# Patient Record
Sex: FEMALE | Race: WHITE | NOT HISPANIC OR LATINO | Employment: OTHER | URBAN - METROPOLITAN AREA
[De-identification: names, ages, dates, MRNs, and addresses within clinical notes are randomized per-mention and may not be internally consistent; named-entity substitution may affect disease eponyms.]

---

## 2023-03-07 PROBLEM — M51.26 LUMBAR DISC HERNIATION: Status: ACTIVE | Noted: 2023-03-07

## 2023-03-07 PROBLEM — E03.9 HYPOTHYROID: Status: ACTIVE | Noted: 2023-03-07

## 2023-03-07 PROBLEM — H60.90 OTITIS EXTERNA: Status: ACTIVE | Noted: 2023-03-07

## 2023-03-07 PROBLEM — I25.10 CORONARY ARTERY ARTERIOSCLEROSIS: Status: ACTIVE | Noted: 2023-03-07

## 2023-03-07 PROBLEM — E11.9 TYPE 2 DIABETES MELLITUS (MULTI): Status: ACTIVE | Noted: 2023-03-07

## 2023-03-07 PROBLEM — K21.9 GERD (GASTROESOPHAGEAL REFLUX DISEASE): Status: ACTIVE | Noted: 2023-03-07

## 2023-03-07 PROBLEM — M94.0 COSTOCHONDRITIS, ACUTE: Status: ACTIVE | Noted: 2023-03-07

## 2023-03-07 PROBLEM — E78.5 DYSLIPIDEMIA: Status: ACTIVE | Noted: 2023-03-07

## 2023-03-07 PROBLEM — M54.50 CHRONIC LOW BACK PAIN: Status: ACTIVE | Noted: 2023-03-07

## 2023-03-07 PROBLEM — G89.29 CHRONIC LOW BACK PAIN: Status: ACTIVE | Noted: 2023-03-07

## 2023-03-07 PROBLEM — M70.62 TROCHANTERIC BURSITIS OF LEFT HIP: Status: ACTIVE | Noted: 2023-03-07

## 2023-03-07 PROBLEM — M54.16 LUMBAR NEURITIS: Status: ACTIVE | Noted: 2023-03-07

## 2023-03-07 PROBLEM — I10 HTN (HYPERTENSION): Status: ACTIVE | Noted: 2023-03-07

## 2023-03-07 PROBLEM — F41.9 ANXIETY: Status: ACTIVE | Noted: 2023-03-07

## 2023-03-07 PROBLEM — E55.9 HYPOVITAMINOSIS D: Status: ACTIVE | Noted: 2023-03-07

## 2023-03-07 RX ORDER — METFORMIN HYDROCHLORIDE 500 MG/1
1 TABLET ORAL EVERY 12 HOURS
COMMUNITY
Start: 2019-02-19 | End: 2023-03-30 | Stop reason: SDUPTHER

## 2023-03-07 RX ORDER — DULAGLUTIDE 0.75 MG/.5ML
0.75 INJECTION, SOLUTION SUBCUTANEOUS
COMMUNITY
Start: 2022-04-06 | End: 2023-09-16 | Stop reason: ALTCHOICE

## 2023-03-07 RX ORDER — ASPIRIN 81 MG/1
1 TABLET ORAL DAILY
COMMUNITY
Start: 2016-06-15

## 2023-03-07 RX ORDER — LEVOTHYROXINE SODIUM 100 UG/1
1 TABLET ORAL DAILY
COMMUNITY
Start: 2014-01-22 | End: 2024-01-10

## 2023-03-07 RX ORDER — DULAGLUTIDE 1.5 MG/.5ML
1.5 INJECTION, SOLUTION SUBCUTANEOUS
COMMUNITY
Start: 2021-07-16 | End: 2023-07-21

## 2023-03-07 RX ORDER — BLOOD-GLUCOSE CONTROL, NORMAL
EACH MISCELLANEOUS
COMMUNITY
End: 2024-01-03 | Stop reason: ALTCHOICE

## 2023-03-07 RX ORDER — NAPROXEN SODIUM 220 MG/1
1 TABLET ORAL DAILY
COMMUNITY
Start: 2019-10-30 | End: 2024-04-29 | Stop reason: ALTCHOICE

## 2023-03-07 RX ORDER — BLOOD-GLUCOSE METER
EACH MISCELLANEOUS 3 TIMES DAILY
COMMUNITY
Start: 2020-05-14 | End: 2024-04-22 | Stop reason: SDUPTHER

## 2023-03-07 RX ORDER — SIMVASTATIN 20 MG/1
1 TABLET, FILM COATED ORAL DAILY
COMMUNITY
Start: 2015-11-03

## 2023-03-07 RX ORDER — VALSARTAN 80 MG/1
1 TABLET ORAL DAILY
COMMUNITY
Start: 2014-01-22 | End: 2023-05-10 | Stop reason: SDUPTHER

## 2023-03-07 RX ORDER — CHOLECALCIFEROL (VITAMIN D3) 50 MCG
2000 TABLET ORAL DAILY
COMMUNITY

## 2023-03-07 RX ORDER — ORPHENADRINE CITRATE 100 MG/1
100 TABLET, EXTENDED RELEASE ORAL EVERY 12 HOURS
COMMUNITY
End: 2023-09-16 | Stop reason: ALTCHOICE

## 2023-03-07 RX ORDER — CIPROFLOXACIN AND DEXAMETHASONE 3; 1 MG/ML; MG/ML
4 SUSPENSION/ DROPS AURICULAR (OTIC) 2 TIMES DAILY
COMMUNITY
Start: 2022-08-22 | End: 2023-09-16 | Stop reason: ALTCHOICE

## 2023-03-07 RX ORDER — GLIPIZIDE 10 MG/1
1 TABLET, FILM COATED, EXTENDED RELEASE ORAL DAILY
COMMUNITY
Start: 2018-09-25 | End: 2023-03-09 | Stop reason: SDUPTHER

## 2023-03-09 ENCOUNTER — OFFICE VISIT (OUTPATIENT)
Dept: PRIMARY CARE | Facility: CLINIC | Age: 78
End: 2023-03-09
Payer: MEDICARE

## 2023-03-09 VITALS
OXYGEN SATURATION: 100 % | WEIGHT: 186.2 LBS | DIASTOLIC BLOOD PRESSURE: 62 MMHG | HEART RATE: 80 BPM | SYSTOLIC BLOOD PRESSURE: 104 MMHG | BODY MASS INDEX: 34.06 KG/M2

## 2023-03-09 DIAGNOSIS — E11.69 TYPE 2 DIABETES MELLITUS WITH OTHER SPECIFIED COMPLICATION, UNSPECIFIED WHETHER LONG TERM INSULIN USE (MULTI): Primary | ICD-10-CM

## 2023-03-09 PROCEDURE — 3074F SYST BP LT 130 MM HG: CPT | Performed by: STUDENT IN AN ORGANIZED HEALTH CARE EDUCATION/TRAINING PROGRAM

## 2023-03-09 PROCEDURE — 99213 OFFICE O/P EST LOW 20 MIN: CPT | Performed by: STUDENT IN AN ORGANIZED HEALTH CARE EDUCATION/TRAINING PROGRAM

## 2023-03-09 PROCEDURE — 1159F MED LIST DOCD IN RCRD: CPT | Performed by: STUDENT IN AN ORGANIZED HEALTH CARE EDUCATION/TRAINING PROGRAM

## 2023-03-09 PROCEDURE — 3078F DIAST BP <80 MM HG: CPT | Performed by: STUDENT IN AN ORGANIZED HEALTH CARE EDUCATION/TRAINING PROGRAM

## 2023-03-09 RX ORDER — NAPROXEN 500 MG/1
1 TABLET ORAL 2 TIMES DAILY
COMMUNITY
Start: 2022-08-31 | End: 2023-09-16 | Stop reason: ALTCHOICE

## 2023-03-09 RX ORDER — AMOXICILLIN 500 MG/1
CAPSULE ORAL
COMMUNITY
Start: 2022-04-12 | End: 2023-09-16 | Stop reason: ALTCHOICE

## 2023-03-09 RX ORDER — AMOXICILLIN AND CLAVULANATE POTASSIUM 875; 125 MG/1; MG/1
1 TABLET, FILM COATED ORAL 2 TIMES DAILY
COMMUNITY
Start: 2022-07-25 | End: 2023-09-16 | Stop reason: ALTCHOICE

## 2023-03-09 RX ORDER — GLIPIZIDE 10 MG/1
10 TABLET, FILM COATED, EXTENDED RELEASE ORAL DAILY
Qty: 90 TABLET | Refills: 3 | Status: SHIPPED | OUTPATIENT
Start: 2023-03-09 | End: 2024-01-24

## 2023-03-09 RX ORDER — MELOXICAM 15 MG/1
TABLET ORAL
COMMUNITY
Start: 2022-05-23 | End: 2023-09-16 | Stop reason: ALTCHOICE

## 2023-03-09 RX ORDER — OMEPRAZOLE 20 MG/1
CAPSULE, DELAYED RELEASE ORAL
COMMUNITY
Start: 2022-09-11 | End: 2023-09-16 | Stop reason: ALTCHOICE

## 2023-03-09 RX ORDER — MOMETASONE FUROATE 1 MG/G
OINTMENT TOPICAL
COMMUNITY
Start: 2023-03-01

## 2023-03-09 NOTE — PATIENT INSTRUCTIONS
1.  Diabetes follow-up.  She had elevated recent fasting glucose readings.  However she had a recent cortisone shot due to back pain.  She had an excellent hemoglobin A1c of 7.0, 3 months ago.  Okay to wait 3 more months before we repeat  Continue current meds healthy diet and fitness.

## 2023-03-09 NOTE — PROGRESS NOTES
Subjective   Patient ID: Cherrie Garcia is a 77 y.o. female who presents for Diabetes.    HPI comes in to discuss some elevated blood pressure readings the past several weeks.  However she did have a steroid shot with a specialist provider for low back pain.    Review of Systems  Constitutional: NO F, chills, or sweats  Eyes: no blurred vision or visual disturbance  ENT: no hearing loss, no congestion, no nasal discharge, no hoarseness and no sore throat.   Cardiovascular: no chest pain, no edema, no palps and no syncope.   Respiratory: no cough,no s.o.b. and no wheezing  Gastrointestinal: no abdominal pain, No C/D no N/V, no blood in stools  Genitourinary: no dysuria, no change in urinary frequency, no urinary hesitancy and no feelings of urinary urgency.   Musculoskeletal: no arthralgias,  no back pain and no myalgias.   Integumentary: no new skin lesions and no rashes.   Neurological: no difficulty walking, no headache, no limb weakness, no numbness and no tingling.   Psychiatric: no anxiety, no depression, no anhedonia and no substance use disorders.   Endocrine: Some elevated blood glucose readings at home  Objective   /62 (BP Location: Left arm, Patient Position: Sitting, BP Cuff Size: Large adult)   Pulse 80   Wt 84.5 kg (186 lb 3.2 oz)   SpO2 100%   BMI 34.06 kg/m²     Physical Exam  gen- a & o x 3, nad, pleasant  heent- eomi, perrla, ear canals patent, TM's non-erythematous, no fluid, frontal and maxillary sinus's nontender  neck- supple, nontender, no palpable or enlarged nodes, no thyromegaly  heart- rrr, no murmurs  lungs- cta b/l , no w/r/r      Assessment/Plan     1.  Diabetes follow-up.  She had elevated recent fasting glucose readings.  However she had a recent cortisone shot due to back pain.  She had an excellent hemoglobin A1c of 7.0, 3 months ago.  Okay to wait 3 more months before we repeat  Continue current meds healthy diet and fitness.

## 2023-03-30 DIAGNOSIS — E11.69 TYPE 2 DIABETES MELLITUS WITH OTHER SPECIFIED COMPLICATION, UNSPECIFIED WHETHER LONG TERM INSULIN USE (MULTI): Primary | ICD-10-CM

## 2023-03-30 RX ORDER — METFORMIN HYDROCHLORIDE 500 MG/1
500 TABLET ORAL EVERY 12 HOURS
Qty: 90 TABLET | Refills: 0 | Status: SHIPPED | OUTPATIENT
Start: 2023-03-30 | End: 2023-05-05 | Stop reason: SDUPTHER

## 2023-05-05 DIAGNOSIS — E11.69 TYPE 2 DIABETES MELLITUS WITH OTHER SPECIFIED COMPLICATION, UNSPECIFIED WHETHER LONG TERM INSULIN USE (MULTI): ICD-10-CM

## 2023-05-05 RX ORDER — METFORMIN HYDROCHLORIDE 500 MG/1
500 TABLET ORAL EVERY 12 HOURS
Qty: 180 TABLET | Refills: 3 | Status: SHIPPED | OUTPATIENT
Start: 2023-05-05 | End: 2024-04-19

## 2023-05-05 NOTE — TELEPHONE ENCOUNTER
Patient left a VM requesting a refill for Metformin 500mg, 90 day supply to be sent to [facility] pharmacy on [address].

## 2023-05-10 DIAGNOSIS — I10 PRIMARY HYPERTENSION: Primary | ICD-10-CM

## 2023-05-10 RX ORDER — VALSARTAN 80 MG/1
80 TABLET ORAL DAILY
Qty: 90 TABLET | Refills: 3 | Status: SHIPPED | OUTPATIENT
Start: 2023-05-10 | End: 2024-04-19

## 2023-05-10 NOTE — TELEPHONE ENCOUNTER
Patient came into the office for a medication refill for Valsartan 80mg to be sent to [facility] on [address] in [place].

## 2023-06-12 ENCOUNTER — TELEPHONE (OUTPATIENT)
Dept: PRIMARY CARE | Facility: CLINIC | Age: 78
End: 2023-06-12
Payer: MEDICARE

## 2023-06-12 DIAGNOSIS — E78.5 DYSLIPIDEMIA: ICD-10-CM

## 2023-06-12 DIAGNOSIS — Z13.0 SCREENING FOR DEFICIENCY ANEMIA: Primary | ICD-10-CM

## 2023-06-12 DIAGNOSIS — Z12.5 PROSTATE CANCER SCREENING: ICD-10-CM

## 2023-06-12 DIAGNOSIS — E55.9 HYPOVITAMINOSIS D: ICD-10-CM

## 2023-06-12 DIAGNOSIS — E03.8 HYPOTHYROIDISM DUE TO HASHIMOTO'S THYROIDITIS: ICD-10-CM

## 2023-06-12 DIAGNOSIS — Z13.29 SCREENING FOR THYROID DISORDER: ICD-10-CM

## 2023-06-12 DIAGNOSIS — E06.3 HYPOTHYROIDISM DUE TO HASHIMOTO'S THYROIDITIS: ICD-10-CM

## 2023-06-12 DIAGNOSIS — E11.69 TYPE 2 DIABETES MELLITUS WITH OTHER SPECIFIED COMPLICATION, UNSPECIFIED WHETHER LONG TERM INSULIN USE (MULTI): ICD-10-CM

## 2023-06-12 DIAGNOSIS — I10 PRIMARY HYPERTENSION: ICD-10-CM

## 2023-06-12 NOTE — TELEPHONE ENCOUNTER
Patient called stating you wanted her to call the office to have you order her blood work prior to her appt  *please advise

## 2023-06-16 ENCOUNTER — LAB (OUTPATIENT)
Dept: LAB | Facility: LAB | Age: 78
End: 2023-06-16
Payer: MEDICARE

## 2023-06-16 DIAGNOSIS — E55.9 HYPOVITAMINOSIS D: ICD-10-CM

## 2023-06-16 DIAGNOSIS — Z12.5 PROSTATE CANCER SCREENING: ICD-10-CM

## 2023-06-16 DIAGNOSIS — I10 PRIMARY HYPERTENSION: ICD-10-CM

## 2023-06-16 DIAGNOSIS — Z13.29 SCREENING FOR THYROID DISORDER: ICD-10-CM

## 2023-06-16 DIAGNOSIS — E03.8 HYPOTHYROIDISM DUE TO HASHIMOTO'S THYROIDITIS: ICD-10-CM

## 2023-06-16 DIAGNOSIS — E11.69 TYPE 2 DIABETES MELLITUS WITH OTHER SPECIFIED COMPLICATION, UNSPECIFIED WHETHER LONG TERM INSULIN USE (MULTI): ICD-10-CM

## 2023-06-16 DIAGNOSIS — E78.5 DYSLIPIDEMIA: ICD-10-CM

## 2023-06-16 DIAGNOSIS — E06.3 HYPOTHYROIDISM DUE TO HASHIMOTO'S THYROIDITIS: ICD-10-CM

## 2023-06-16 DIAGNOSIS — Z13.0 SCREENING FOR DEFICIENCY ANEMIA: ICD-10-CM

## 2023-06-16 LAB
ALANINE AMINOTRANSFERASE (SGPT) (U/L) IN SER/PLAS: 20 U/L (ref 7–45)
ALBUMIN (G/DL) IN SER/PLAS: 4 G/DL (ref 3.4–5)
ALBUMIN (MG/L) IN URINE: 7.4 MG/L
ALBUMIN/CREATININE (UG/MG) IN URINE: 4.6 UG/MG CRT (ref 0–30)
ALKALINE PHOSPHATASE (U/L) IN SER/PLAS: 32 U/L (ref 33–136)
ANION GAP IN SER/PLAS: 11 MMOL/L (ref 10–20)
APPEARANCE, URINE: NORMAL
ASPARTATE AMINOTRANSFERASE (SGOT) (U/L) IN SER/PLAS: 22 U/L (ref 9–39)
BASOPHILS (10*3/UL) IN BLOOD BY AUTOMATED COUNT: 0.05 X10E9/L (ref 0–0.1)
BASOPHILS/100 LEUKOCYTES IN BLOOD BY AUTOMATED COUNT: 1.1 % (ref 0–2)
BILIRUBIN TOTAL (MG/DL) IN SER/PLAS: 1.5 MG/DL (ref 0–1.2)
BILIRUBIN, URINE: NEGATIVE
BLOOD, URINE: NEGATIVE
CALCIDIOL (25 OH VITAMIN D3) (NG/ML) IN SER/PLAS: 36 NG/ML
CALCIUM (MG/DL) IN SER/PLAS: 8.9 MG/DL (ref 8.6–10.3)
CARBON DIOXIDE, TOTAL (MMOL/L) IN SER/PLAS: 28 MMOL/L (ref 21–32)
CHLORIDE (MMOL/L) IN SER/PLAS: 106 MMOL/L (ref 98–107)
CHOLESTEROL (MG/DL) IN SER/PLAS: 112 MG/DL (ref 0–199)
CHOLESTEROL IN HDL (MG/DL) IN SER/PLAS: 52.2 MG/DL
CHOLESTEROL/HDL RATIO: 2.1
COBALAMIN (VITAMIN B12) (PG/ML) IN SER/PLAS: 271 PG/ML (ref 211–911)
COLOR, URINE: YELLOW
CREATININE (MG/DL) IN SER/PLAS: 0.61 MG/DL (ref 0.5–1.05)
CREATININE (MG/DL) IN URINE: 161 MG/DL (ref 20–320)
EOSINOPHILS (10*3/UL) IN BLOOD BY AUTOMATED COUNT: 0.23 X10E9/L (ref 0–0.4)
EOSINOPHILS/100 LEUKOCYTES IN BLOOD BY AUTOMATED COUNT: 4.9 % (ref 0–6)
ERYTHROCYTE DISTRIBUTION WIDTH (RATIO) BY AUTOMATED COUNT: 11.9 % (ref 11.5–14.5)
ERYTHROCYTE MEAN CORPUSCULAR HEMOGLOBIN CONCENTRATION (G/DL) BY AUTOMATED: 33.3 G/DL (ref 32–36)
ERYTHROCYTE MEAN CORPUSCULAR VOLUME (FL) BY AUTOMATED COUNT: 94 FL (ref 80–100)
ERYTHROCYTES (10*6/UL) IN BLOOD BY AUTOMATED COUNT: 4.44 X10E12/L (ref 4–5.2)
ESTIMATED AVERAGE GLUCOSE FOR HBA1C: 186 MG/DL
GFR FEMALE: >90 ML/MIN/1.73M2
GLUCOSE (MG/DL) IN SER/PLAS: 132 MG/DL (ref 74–99)
GLUCOSE, URINE: NEGATIVE MG/DL
HEMATOCRIT (%) IN BLOOD BY AUTOMATED COUNT: 41.8 % (ref 36–46)
HEMOGLOBIN (G/DL) IN BLOOD: 13.9 G/DL (ref 12–16)
HEMOGLOBIN A1C/HEMOGLOBIN TOTAL IN BLOOD: 8.1 %
IMMATURE GRANULOCYTES/100 LEUKOCYTES IN BLOOD BY AUTOMATED COUNT: 0.2 % (ref 0–0.9)
KETONES, URINE: NEGATIVE MG/DL
LDL: 35 MG/DL (ref 0–99)
LEUKOCYTE ESTERASE, URINE: NEGATIVE
LEUKOCYTES (10*3/UL) IN BLOOD BY AUTOMATED COUNT: 4.7 X10E9/L (ref 4.4–11.3)
LYMPHOCYTES (10*3/UL) IN BLOOD BY AUTOMATED COUNT: 1.61 X10E9/L (ref 0.8–3)
LYMPHOCYTES/100 LEUKOCYTES IN BLOOD BY AUTOMATED COUNT: 34 % (ref 13–44)
MONOCYTES (10*3/UL) IN BLOOD BY AUTOMATED COUNT: 0.36 X10E9/L (ref 0.05–0.8)
MONOCYTES/100 LEUKOCYTES IN BLOOD BY AUTOMATED COUNT: 7.6 % (ref 2–10)
NEUTROPHILS (10*3/UL) IN BLOOD BY AUTOMATED COUNT: 2.47 X10E9/L (ref 1.6–5.5)
NEUTROPHILS/100 LEUKOCYTES IN BLOOD BY AUTOMATED COUNT: 52.2 % (ref 40–80)
NITRITE, URINE: NEGATIVE
NRBC (PER 100 WBCS) BY AUTOMATED COUNT: 0 /100 WBC (ref 0–0)
PH, URINE: 5 (ref 5–8)
PLATELETS (10*3/UL) IN BLOOD AUTOMATED COUNT: 243 X10E9/L (ref 150–450)
POTASSIUM (MMOL/L) IN SER/PLAS: 4.8 MMOL/L (ref 3.5–5.3)
PROTEIN TOTAL: 5.8 G/DL (ref 6.4–8.2)
PROTEIN, URINE: NEGATIVE MG/DL
SODIUM (MMOL/L) IN SER/PLAS: 140 MMOL/L (ref 136–145)
SPECIFIC GRAVITY, URINE: 1.02 (ref 1–1.03)
THYROTROPIN (MIU/L) IN SER/PLAS BY DETECTION LIMIT <= 0.05 MIU/L: 4.31 MIU/L (ref 0.44–3.98)
THYROXINE (T4) FREE (NG/DL) IN SER/PLAS: 1.12 NG/DL (ref 0.61–1.12)
TRIGLYCERIDE (MG/DL) IN SER/PLAS: 126 MG/DL (ref 0–149)
UREA NITROGEN (MG/DL) IN SER/PLAS: 12 MG/DL (ref 6–23)
UROBILINOGEN, URINE: <2 MG/DL (ref 0–1.9)
VLDL: 25 MG/DL (ref 0–40)

## 2023-06-16 PROCEDURE — 82043 UR ALBUMIN QUANTITATIVE: CPT

## 2023-06-16 PROCEDURE — 82306 VITAMIN D 25 HYDROXY: CPT

## 2023-06-16 PROCEDURE — 81003 URINALYSIS AUTO W/O SCOPE: CPT

## 2023-06-16 PROCEDURE — 83036 HEMOGLOBIN GLYCOSYLATED A1C: CPT

## 2023-06-16 PROCEDURE — 85025 COMPLETE CBC W/AUTO DIFF WBC: CPT

## 2023-06-16 PROCEDURE — 84439 ASSAY OF FREE THYROXINE: CPT

## 2023-06-16 PROCEDURE — 82607 VITAMIN B-12: CPT

## 2023-06-16 PROCEDURE — 80053 COMPREHEN METABOLIC PANEL: CPT

## 2023-06-16 PROCEDURE — 36415 COLL VENOUS BLD VENIPUNCTURE: CPT

## 2023-06-16 PROCEDURE — 82570 ASSAY OF URINE CREATININE: CPT

## 2023-06-16 PROCEDURE — 84443 ASSAY THYROID STIM HORMONE: CPT

## 2023-06-16 PROCEDURE — 80061 LIPID PANEL: CPT

## 2023-06-21 ENCOUNTER — APPOINTMENT (OUTPATIENT)
Dept: PRIMARY CARE | Facility: CLINIC | Age: 78
End: 2023-06-21
Payer: MEDICARE

## 2023-07-20 DIAGNOSIS — E11.9 TYPE 2 DIABETES MELLITUS WITHOUT COMPLICATIONS (MULTI): ICD-10-CM

## 2023-07-20 PROBLEM — M19.90 ARTHRITIS: Status: ACTIVE | Noted: 2023-07-20

## 2023-07-20 PROBLEM — I10 HYPERTENSION: Status: ACTIVE | Noted: 2023-07-20

## 2023-07-20 PROBLEM — M25.562 CHRONIC PAIN OF LEFT KNEE: Status: ACTIVE | Noted: 2017-01-18

## 2023-07-20 PROBLEM — G89.29 CHRONIC PAIN OF LEFT KNEE: Status: ACTIVE | Noted: 2017-01-18

## 2023-07-20 PROBLEM — G89.29 HEEL PAIN, CHRONIC: Status: ACTIVE | Noted: 2017-01-18

## 2023-07-20 PROBLEM — M79.673 HEEL PAIN, CHRONIC: Status: ACTIVE | Noted: 2017-01-18

## 2023-07-20 PROBLEM — K21.9 GASTROESOPHAGEAL REFLUX DISEASE: Status: ACTIVE | Noted: 2023-07-20

## 2023-07-20 PROBLEM — M54.50 LOW BACK PAIN: Status: ACTIVE | Noted: 2023-07-20

## 2023-07-20 RX ORDER — ERYTHROMYCIN 5 MG/G
OINTMENT OPHTHALMIC
COMMUNITY
Start: 2023-07-05 | End: 2023-09-16 | Stop reason: ALTCHOICE

## 2023-07-20 RX ORDER — GABAPENTIN 300 MG/1
CAPSULE ORAL
COMMUNITY
End: 2023-09-16 | Stop reason: ALTCHOICE

## 2023-07-21 RX ORDER — DULAGLUTIDE 1.5 MG/.5ML
1.5 INJECTION, SOLUTION SUBCUTANEOUS
Qty: 6 ML | Refills: 3 | Status: SHIPPED | OUTPATIENT
Start: 2023-07-21 | End: 2024-02-26 | Stop reason: ALTCHOICE

## 2023-09-16 ENCOUNTER — OFFICE VISIT (OUTPATIENT)
Dept: PRIMARY CARE | Facility: CLINIC | Age: 78
End: 2023-09-16
Payer: MEDICARE

## 2023-09-16 VITALS
DIASTOLIC BLOOD PRESSURE: 60 MMHG | WEIGHT: 186.4 LBS | OXYGEN SATURATION: 98 % | SYSTOLIC BLOOD PRESSURE: 104 MMHG | HEART RATE: 85 BPM | BODY MASS INDEX: 34.09 KG/M2

## 2023-09-16 DIAGNOSIS — J06.9 URI, ACUTE: Primary | ICD-10-CM

## 2023-09-16 PROCEDURE — 3074F SYST BP LT 130 MM HG: CPT | Performed by: STUDENT IN AN ORGANIZED HEALTH CARE EDUCATION/TRAINING PROGRAM

## 2023-09-16 PROCEDURE — 99213 OFFICE O/P EST LOW 20 MIN: CPT | Performed by: STUDENT IN AN ORGANIZED HEALTH CARE EDUCATION/TRAINING PROGRAM

## 2023-09-16 PROCEDURE — 3078F DIAST BP <80 MM HG: CPT | Performed by: STUDENT IN AN ORGANIZED HEALTH CARE EDUCATION/TRAINING PROGRAM

## 2023-09-16 PROCEDURE — 1036F TOBACCO NON-USER: CPT | Performed by: STUDENT IN AN ORGANIZED HEALTH CARE EDUCATION/TRAINING PROGRAM

## 2023-09-16 PROCEDURE — 1126F AMNT PAIN NOTED NONE PRSNT: CPT | Performed by: STUDENT IN AN ORGANIZED HEALTH CARE EDUCATION/TRAINING PROGRAM

## 2023-09-16 PROCEDURE — 1159F MED LIST DOCD IN RCRD: CPT | Performed by: STUDENT IN AN ORGANIZED HEALTH CARE EDUCATION/TRAINING PROGRAM

## 2023-09-16 RX ORDER — SULFAMETHOXAZOLE AND TRIMETHOPRIM 800; 160 MG/1; MG/1
1 TABLET ORAL 2 TIMES DAILY
Qty: 20 TABLET | Refills: 0 | Status: SHIPPED | OUTPATIENT
Start: 2023-09-16 | End: 2023-09-26

## 2023-09-16 ASSESSMENT — ENCOUNTER SYMPTOMS: DEPRESSION: 0

## 2023-09-16 ASSESSMENT — PAIN SCALES - GENERAL: PAINLEVEL: 0-NO PAIN

## 2023-09-16 NOTE — PATIENT INSTRUCTIONS
1.  Several days of upper respiratory sinus symptoms concern for early sinusitis.  Bactrim p.o. twice daily x10 days.  Drinking plenty of fluids and getting lots of rest. Chicken soup and hot beverages may help.  Trial of nasal irrigation with a Nettipot or squeeze bottle with sterile salt water.  Nasal spray corticosteroids (Flonase) may help in reducing the inflammatory response in the nasal passages and airways. Please try 2 sprays each nostril daily for 2 weeks.  If you have season allergies, please take a daily antihistamine of your choice, such as Zyrtec/Claritin/Allegra at bedtime.  Take Tylenol or Motrin/Aleve for sinus or ear pain.  If you have good blood pressure you can try pseudofed (you must ask the pharmacist for it and show ID).  Please take your antibiotic until all gone. Also take a probiotic or daily yogurt to help reduce common side effects of upset stomach and diarrhea.  Please call or return to the office if you are not feeling better in the next 3-4 days after starting treatment.    #2.  She also has a small infection, cellulitis, of her foot.  The Bactrim should help with this as well.

## 2023-09-16 NOTE — PROGRESS NOTES
Subjective   Patient ID: Cherrie Garcia is a 78 y.o. female who presents for Sinusitis and Rash (Rash on toe of right foot.).    HPI comes in with several days of upper respiratory sinus pressure congestion no fever no chills no sweats mild cough no wheezing.    Review of Systems  Constitutional: Symptoms as per history of present   Eyes: no blurred vision or visual disturbance  ENT: no hearing loss, positive symptoms as per history of present illness  Cardiovascular: no chest pain, no edema, no palps and no syncope.   Respiratory: symptoms as per history of present illness  Gastrointestinal: no abdominal pain, No C/D no N/V, no blood in stools  Genitourinary: no dysuria, no change in urinary frequency, no urinary hesitancy and no feelings of urinary urgency.   Musculoskeletal: no arthralgias,  no back pain and no myalgias.   Neurological: no difficulty walking, no headache, no limb weakness, no numbness and no tingling.    Objective   /60 (BP Location: Left arm, Patient Position: Sitting, BP Cuff Size: Large adult)   Pulse 85   Wt 84.6 kg (186 lb 6.4 oz)   SpO2 98%   BMI 34.09 kg/m²     Physical Exam  gen- a & o x 3, positive coughing  heent- eomi, perrla, positive fluid behind TM's however non-erythematous, positive postnasal drip, positive rhinorrhea inflamed mucous membranes in the nasopharynx, positive sinus TTP  neck- positive cervical lymphadenopathy  heart- rrr, no murmurs  lungs- cta b/l , no w/r/r  chest- symmetric, nontender  ab- soft, nontender, no organomegaly, +bowel sounds  ex's- no c/c/e  neuro- CNs 2-12 grossly intact, full sensation and strength in all ex's    Assessment/Plan     1.  Several days of upper respiratory sinus symptoms concern for early sinusitis.  Bactrim p.o. twice daily x10 days.  Drinking plenty of fluids and getting lots of rest. Chicken soup and hot beverages may help.  Trial of nasal irrigation with a Nettipot or squeeze bottle with sterile salt water.  Nasal spray  corticosteroids (Flonase) may help in reducing the inflammatory response in the nasal passages and airways. Please try 2 sprays each nostril daily for 2 weeks.  If you have season allergies, please take a daily antihistamine of your choice, such as Zyrtec/Claritin/Allegra at bedtime.  Take Tylenol or Motrin/Aleve for sinus or ear pain.  If you have good blood pressure you can try pseudofed (you must ask the pharmacist for it and show ID).  Please take your antibiotic until all gone. Also take a probiotic or daily yogurt to help reduce common side effects of upset stomach and diarrhea.  Please call or return to the office if you are not feeling better in the next 3-4 days after starting treatment.    #2.  She also has a small infection, cellulitis, of her foot.  The Bactrim should help with this as well.

## 2023-11-28 ENCOUNTER — TELEPHONE (OUTPATIENT)
Dept: PRIMARY CARE | Facility: CLINIC | Age: 78
End: 2023-11-28
Payer: MEDICARE

## 2023-11-28 DIAGNOSIS — E55.9 HYPOVITAMINOSIS D: ICD-10-CM

## 2023-11-28 DIAGNOSIS — K21.9 GASTROESOPHAGEAL REFLUX DISEASE, UNSPECIFIED WHETHER ESOPHAGITIS PRESENT: ICD-10-CM

## 2023-11-28 DIAGNOSIS — E11.10: ICD-10-CM

## 2023-11-28 DIAGNOSIS — E03.8 HYPOTHYROIDISM DUE TO HASHIMOTO'S THYROIDITIS: ICD-10-CM

## 2023-11-28 DIAGNOSIS — Z00.00 WELLNESS EXAMINATION: Primary | ICD-10-CM

## 2023-11-28 DIAGNOSIS — Z13.29 SCREENING FOR THYROID DISORDER: ICD-10-CM

## 2023-11-28 DIAGNOSIS — I10 PRIMARY HYPERTENSION: ICD-10-CM

## 2023-11-28 DIAGNOSIS — E06.3 HYPOTHYROIDISM DUE TO HASHIMOTO'S THYROIDITIS: ICD-10-CM

## 2023-11-28 DIAGNOSIS — Z13.6 ENCOUNTER FOR SCREENING FOR CARDIOVASCULAR DISORDERS: ICD-10-CM

## 2023-11-28 DIAGNOSIS — Z13.0 SCREENING FOR DEFICIENCY ANEMIA: ICD-10-CM

## 2023-11-28 NOTE — TELEPHONE ENCOUNTER
Pt called and is coming in on 12/11 for her medicare wellness and would like for you to place the orders for her blood work so that she can get them done prior to appt.

## 2023-12-05 ENCOUNTER — LAB (OUTPATIENT)
Dept: LAB | Facility: LAB | Age: 78
End: 2023-12-05
Payer: MEDICARE

## 2023-12-05 DIAGNOSIS — Z00.00 WELLNESS EXAMINATION: ICD-10-CM

## 2023-12-05 DIAGNOSIS — E06.3 HYPOTHYROIDISM DUE TO HASHIMOTO'S THYROIDITIS: ICD-10-CM

## 2023-12-05 DIAGNOSIS — E03.8 HYPOTHYROIDISM DUE TO HASHIMOTO'S THYROIDITIS: ICD-10-CM

## 2023-12-05 DIAGNOSIS — K21.9 GASTROESOPHAGEAL REFLUX DISEASE, UNSPECIFIED WHETHER ESOPHAGITIS PRESENT: ICD-10-CM

## 2023-12-05 DIAGNOSIS — Z13.0 SCREENING FOR DEFICIENCY ANEMIA: ICD-10-CM

## 2023-12-05 DIAGNOSIS — Z13.6 ENCOUNTER FOR SCREENING FOR CARDIOVASCULAR DISORDERS: ICD-10-CM

## 2023-12-05 DIAGNOSIS — I10 PRIMARY HYPERTENSION: ICD-10-CM

## 2023-12-05 DIAGNOSIS — E55.9 HYPOVITAMINOSIS D: ICD-10-CM

## 2023-12-05 DIAGNOSIS — Z13.29 SCREENING FOR THYROID DISORDER: ICD-10-CM

## 2023-12-05 DIAGNOSIS — E11.10: ICD-10-CM

## 2023-12-05 LAB
25(OH)D3 SERPL-MCNC: 35 NG/ML (ref 30–100)
ALBUMIN SERPL BCP-MCNC: 4.5 G/DL (ref 3.4–5)
ALP SERPL-CCNC: 32 U/L (ref 33–136)
ALT SERPL W P-5'-P-CCNC: 18 U/L (ref 7–45)
ANION GAP SERPL CALC-SCNC: 13 MMOL/L (ref 10–20)
APPEARANCE UR: ABNORMAL
AST SERPL W P-5'-P-CCNC: 19 U/L (ref 9–39)
BACTERIA #/AREA URNS AUTO: ABNORMAL /HPF
BASOPHILS # BLD AUTO: 0.09 X10*3/UL (ref 0–0.1)
BASOPHILS NFR BLD AUTO: 1.6 %
BILIRUB SERPL-MCNC: 1.6 MG/DL (ref 0–1.2)
BILIRUB UR STRIP.AUTO-MCNC: NEGATIVE MG/DL
BUN SERPL-MCNC: 13 MG/DL (ref 6–23)
CALCIUM SERPL-MCNC: 9.7 MG/DL (ref 8.6–10.3)
CHLORIDE SERPL-SCNC: 102 MMOL/L (ref 98–107)
CHOLEST SERPL-MCNC: 156 MG/DL (ref 0–199)
CHOLESTEROL/HDL RATIO: 2.5
CO2 SERPL-SCNC: 27 MMOL/L (ref 21–32)
COLOR UR: YELLOW
CREAT SERPL-MCNC: 0.81 MG/DL (ref 0.5–1.05)
CREAT UR-MCNC: 129.6 MG/DL (ref 20–320)
EOSINOPHIL # BLD AUTO: 0.2 X10*3/UL (ref 0–0.4)
EOSINOPHIL NFR BLD AUTO: 3.5 %
ERYTHROCYTE [DISTWIDTH] IN BLOOD BY AUTOMATED COUNT: 11.9 % (ref 11.5–14.5)
EST. AVERAGE GLUCOSE BLD GHB EST-MCNC: 163 MG/DL
GFR SERPL CREATININE-BSD FRML MDRD: 74 ML/MIN/1.73M*2
GLUCOSE SERPL-MCNC: 147 MG/DL (ref 74–99)
GLUCOSE UR STRIP.AUTO-MCNC: NEGATIVE MG/DL
HBA1C MFR BLD: 7.3 %
HCT VFR BLD AUTO: 45.4 % (ref 36–46)
HDLC SERPL-MCNC: 61.7 MG/DL
HGB BLD-MCNC: 15.1 G/DL (ref 12–16)
HYALINE CASTS #/AREA URNS AUTO: ABNORMAL /LPF
IMM GRANULOCYTES # BLD AUTO: 0.02 X10*3/UL (ref 0–0.5)
IMM GRANULOCYTES NFR BLD AUTO: 0.3 % (ref 0–0.9)
KETONES UR STRIP.AUTO-MCNC: NEGATIVE MG/DL
LDLC SERPL CALC-MCNC: 57 MG/DL
LEUKOCYTE ESTERASE UR QL STRIP.AUTO: ABNORMAL
LYMPHOCYTES # BLD AUTO: 1.75 X10*3/UL (ref 0.8–3)
LYMPHOCYTES NFR BLD AUTO: 30.3 %
MCH RBC QN AUTO: 31.3 PG (ref 26–34)
MCHC RBC AUTO-ENTMCNC: 33.3 G/DL (ref 32–36)
MCV RBC AUTO: 94 FL (ref 80–100)
MICROALBUMIN UR-MCNC: 10.4 MG/L
MICROALBUMIN/CREAT UR: 8 UG/MG CREAT
MONOCYTES # BLD AUTO: 0.44 X10*3/UL (ref 0.05–0.8)
MONOCYTES NFR BLD AUTO: 7.6 %
MUCOUS THREADS #/AREA URNS AUTO: ABNORMAL /LPF
NEUTROPHILS # BLD AUTO: 3.27 X10*3/UL (ref 1.6–5.5)
NEUTROPHILS NFR BLD AUTO: 56.7 %
NITRITE UR QL STRIP.AUTO: NEGATIVE
NON HDL CHOLESTEROL: 94 MG/DL (ref 0–149)
NRBC BLD-RTO: 0 /100 WBCS (ref 0–0)
PH UR STRIP.AUTO: 5 [PH]
PLATELET # BLD AUTO: 288 X10*3/UL (ref 150–450)
POTASSIUM SERPL-SCNC: 4.7 MMOL/L (ref 3.5–5.3)
PROT SERPL-MCNC: 6.8 G/DL (ref 6.4–8.2)
PROT UR STRIP.AUTO-MCNC: NEGATIVE MG/DL
RBC # BLD AUTO: 4.83 X10*6/UL (ref 4–5.2)
RBC # UR STRIP.AUTO: NEGATIVE /UL
RBC #/AREA URNS AUTO: ABNORMAL /HPF
SODIUM SERPL-SCNC: 137 MMOL/L (ref 136–145)
SP GR UR STRIP.AUTO: 1.02
SQUAMOUS #/AREA URNS AUTO: ABNORMAL /HPF
T4 FREE SERPL-MCNC: 1.1 NG/DL (ref 0.61–1.12)
TRIGL SERPL-MCNC: 187 MG/DL (ref 0–149)
TSH SERPL-ACNC: 4.38 MIU/L (ref 0.44–3.98)
UROBILINOGEN UR STRIP.AUTO-MCNC: <2 MG/DL
VIT B12 SERPL-MCNC: 323 PG/ML (ref 211–911)
VLDL: 37 MG/DL (ref 0–40)
WBC # BLD AUTO: 5.8 X10*3/UL (ref 4.4–11.3)
WBC #/AREA URNS AUTO: ABNORMAL /HPF

## 2023-12-05 PROCEDURE — 36415 COLL VENOUS BLD VENIPUNCTURE: CPT

## 2023-12-05 PROCEDURE — 84443 ASSAY THYROID STIM HORMONE: CPT

## 2023-12-05 PROCEDURE — 85025 COMPLETE CBC W/AUTO DIFF WBC: CPT

## 2023-12-05 PROCEDURE — 84439 ASSAY OF FREE THYROXINE: CPT

## 2023-12-05 PROCEDURE — 80061 LIPID PANEL: CPT

## 2023-12-05 PROCEDURE — 80053 COMPREHEN METABOLIC PANEL: CPT

## 2023-12-05 PROCEDURE — 82043 UR ALBUMIN QUANTITATIVE: CPT

## 2023-12-05 PROCEDURE — 82570 ASSAY OF URINE CREATININE: CPT

## 2023-12-05 PROCEDURE — 81001 URINALYSIS AUTO W/SCOPE: CPT

## 2023-12-05 PROCEDURE — 83036 HEMOGLOBIN GLYCOSYLATED A1C: CPT

## 2023-12-05 PROCEDURE — 82306 VITAMIN D 25 HYDROXY: CPT

## 2023-12-05 PROCEDURE — 82607 VITAMIN B-12: CPT

## 2023-12-06 DIAGNOSIS — N39.0 ACUTE LOWER UTI: Primary | ICD-10-CM

## 2023-12-06 RX ORDER — CIPROFLOXACIN 500 MG/1
500 TABLET ORAL 2 TIMES DAILY
Qty: 14 TABLET | Refills: 0 | Status: SHIPPED | OUTPATIENT
Start: 2023-12-06 | End: 2023-12-13

## 2023-12-07 ENCOUNTER — TELEPHONE (OUTPATIENT)
Dept: PRIMARY CARE | Facility: CLINIC | Age: 78
End: 2023-12-07
Payer: MEDICARE

## 2023-12-07 DIAGNOSIS — J06.9 URI, ACUTE: Primary | ICD-10-CM

## 2023-12-07 RX ORDER — SULFAMETHOXAZOLE AND TRIMETHOPRIM 800; 160 MG/1; MG/1
1 TABLET ORAL 2 TIMES DAILY
Qty: 20 TABLET | Refills: 0 | Status: SHIPPED | OUTPATIENT
Start: 2023-12-07 | End: 2023-12-17

## 2023-12-07 RX ORDER — CLARITHROMYCIN 500 MG/1
500 TABLET, FILM COATED ORAL 2 TIMES DAILY
Qty: 20 TABLET | Refills: 0 | Status: SHIPPED | OUTPATIENT
Start: 2023-12-07 | End: 2023-12-17

## 2023-12-07 NOTE — TELEPHONE ENCOUNTER
Pt called and states that she has a sinus infection and would like to know if you could send her in something for it. Pharmacy is on file and allergic to amoxicillin-pot, diclofenac, penicillins and pseudoephedrine

## 2023-12-07 NOTE — TELEPHONE ENCOUNTER
Patient states she has a sinus infection, temp of 99F, and chills. Requesting medication for this.

## 2023-12-11 ENCOUNTER — APPOINTMENT (OUTPATIENT)
Dept: PRIMARY CARE | Facility: CLINIC | Age: 78
End: 2023-12-11
Payer: MEDICARE

## 2024-01-03 ENCOUNTER — OFFICE VISIT (OUTPATIENT)
Dept: PRIMARY CARE | Facility: CLINIC | Age: 79
End: 2024-01-03
Payer: MEDICARE

## 2024-01-03 VITALS
BODY MASS INDEX: 34.63 KG/M2 | HEART RATE: 80 BPM | HEIGHT: 62 IN | DIASTOLIC BLOOD PRESSURE: 74 MMHG | SYSTOLIC BLOOD PRESSURE: 116 MMHG | TEMPERATURE: 98.1 F | WEIGHT: 188.2 LBS | OXYGEN SATURATION: 100 %

## 2024-01-03 DIAGNOSIS — E11.69 TYPE 2 DIABETES MELLITUS WITH OTHER SPECIFIED COMPLICATION, UNSPECIFIED WHETHER LONG TERM INSULIN USE (MULTI): ICD-10-CM

## 2024-01-03 DIAGNOSIS — Z13.0 SCREENING FOR DEFICIENCY ANEMIA: ICD-10-CM

## 2024-01-03 DIAGNOSIS — E06.3 HYPOTHYROIDISM DUE TO HASHIMOTO'S THYROIDITIS: ICD-10-CM

## 2024-01-03 DIAGNOSIS — Z13.6 ENCOUNTER FOR SCREENING FOR CARDIOVASCULAR DISORDERS: ICD-10-CM

## 2024-01-03 DIAGNOSIS — I10 PRIMARY HYPERTENSION: ICD-10-CM

## 2024-01-03 DIAGNOSIS — Z13.29 SCREENING FOR THYROID DISORDER: ICD-10-CM

## 2024-01-03 DIAGNOSIS — E78.5 DYSLIPIDEMIA: ICD-10-CM

## 2024-01-03 DIAGNOSIS — Z00.00 WELLNESS EXAMINATION: Primary | ICD-10-CM

## 2024-01-03 DIAGNOSIS — K21.9 GASTROESOPHAGEAL REFLUX DISEASE, UNSPECIFIED WHETHER ESOPHAGITIS PRESENT: ICD-10-CM

## 2024-01-03 DIAGNOSIS — E03.8 HYPOTHYROIDISM DUE TO HASHIMOTO'S THYROIDITIS: ICD-10-CM

## 2024-01-03 PROCEDURE — 1036F TOBACCO NON-USER: CPT | Performed by: STUDENT IN AN ORGANIZED HEALTH CARE EDUCATION/TRAINING PROGRAM

## 2024-01-03 PROCEDURE — 3078F DIAST BP <80 MM HG: CPT | Performed by: STUDENT IN AN ORGANIZED HEALTH CARE EDUCATION/TRAINING PROGRAM

## 2024-01-03 PROCEDURE — 3074F SYST BP LT 130 MM HG: CPT | Performed by: STUDENT IN AN ORGANIZED HEALTH CARE EDUCATION/TRAINING PROGRAM

## 2024-01-03 PROCEDURE — 1159F MED LIST DOCD IN RCRD: CPT | Performed by: STUDENT IN AN ORGANIZED HEALTH CARE EDUCATION/TRAINING PROGRAM

## 2024-01-03 PROCEDURE — 1170F FXNL STATUS ASSESSED: CPT | Performed by: STUDENT IN AN ORGANIZED HEALTH CARE EDUCATION/TRAINING PROGRAM

## 2024-01-03 PROCEDURE — 99214 OFFICE O/P EST MOD 30 MIN: CPT | Performed by: STUDENT IN AN ORGANIZED HEALTH CARE EDUCATION/TRAINING PROGRAM

## 2024-01-03 PROCEDURE — G0439 PPPS, SUBSEQ VISIT: HCPCS | Performed by: STUDENT IN AN ORGANIZED HEALTH CARE EDUCATION/TRAINING PROGRAM

## 2024-01-03 PROCEDURE — 1126F AMNT PAIN NOTED NONE PRSNT: CPT | Performed by: STUDENT IN AN ORGANIZED HEALTH CARE EDUCATION/TRAINING PROGRAM

## 2024-01-03 PROCEDURE — 1160F RVW MEDS BY RX/DR IN RCRD: CPT | Performed by: STUDENT IN AN ORGANIZED HEALTH CARE EDUCATION/TRAINING PROGRAM

## 2024-01-03 RX ORDER — LATANOPROST 50 UG/ML
SOLUTION/ DROPS OPHTHALMIC
COMMUNITY
Start: 2023-12-12 | End: 2024-02-27 | Stop reason: ALTCHOICE

## 2024-01-03 RX ORDER — PREDNISOLONE ACETATE 10 MG/ML
SUSPENSION/ DROPS OPHTHALMIC
COMMUNITY
Start: 2023-12-08 | End: 2024-02-27 | Stop reason: ALTCHOICE

## 2024-01-03 RX ORDER — AMOXICILLIN 500 MG/1
CAPSULE ORAL
COMMUNITY
Start: 2023-10-04 | End: 2024-02-27 | Stop reason: ALTCHOICE

## 2024-01-03 RX ORDER — OFLOXACIN 3 MG/ML
SOLUTION/ DROPS OPHTHALMIC
COMMUNITY
Start: 2023-12-22 | End: 2024-02-27 | Stop reason: ALTCHOICE

## 2024-01-03 RX ORDER — DORZOLAMIDE HYDROCHLORIDE AND TIMOLOL MALEATE PRESERVATIVE FREE 20; 5 MG/ML; MG/ML
SOLUTION/ DROPS OPHTHALMIC
COMMUNITY
Start: 2023-12-13 | End: 2024-04-29 | Stop reason: ALTCHOICE

## 2024-01-03 RX ORDER — MUPIROCIN 20 MG/G
OINTMENT TOPICAL
COMMUNITY
Start: 2023-09-18 | End: 2024-02-27 | Stop reason: ALTCHOICE

## 2024-01-03 RX ORDER — LANCETS
EACH MISCELLANEOUS
Qty: 100 EACH | Refills: 3 | Status: SHIPPED | OUTPATIENT
Start: 2024-01-03

## 2024-01-03 RX ORDER — ATROPINE SULFATE 10 MG/ML
SOLUTION/ DROPS OPHTHALMIC
COMMUNITY
Start: 2023-11-03 | End: 2024-02-27 | Stop reason: ALTCHOICE

## 2024-01-03 ASSESSMENT — PATIENT HEALTH QUESTIONNAIRE - PHQ9
2. FEELING DOWN, DEPRESSED OR HOPELESS: NOT AT ALL
1. LITTLE INTEREST OR PLEASURE IN DOING THINGS: NOT AT ALL
SUM OF ALL RESPONSES TO PHQ9 QUESTIONS 1 AND 2: 0

## 2024-01-03 ASSESSMENT — ACTIVITIES OF DAILY LIVING (ADL)
DOING_HOUSEWORK: INDEPENDENT
GROCERY_SHOPPING: INDEPENDENT
MANAGING_FINANCES: INDEPENDENT
DRESSING: INDEPENDENT
TAKING_MEDICATION: INDEPENDENT
BATHING: INDEPENDENT

## 2024-01-03 ASSESSMENT — ENCOUNTER SYMPTOMS
LOSS OF SENSATION IN FEET: 0
OCCASIONAL FEELINGS OF UNSTEADINESS: 0
DEPRESSION: 0

## 2024-01-03 ASSESSMENT — PAIN SCALES - GENERAL: PAINLEVEL: 0-NO PAIN

## 2024-01-03 NOTE — PROGRESS NOTES
"Subjective   Patient ID: Cherrie Garcia is a 78 y.o. female who presents for Annual Exam (Assessment annual medicare wellness not fasting ).    HPI comes in for Medicare wellness    Review of Systems  Constitutional: NO F, chills, or sweats  Eyes: no blurred vision or visual disturbance  ENT: no hearing loss, no congestion, no nasal discharge, no hoarseness and no sore throat.   Cardiovascular: no chest pain, no edema, no palps and no syncope.   Respiratory: no cough,no s.o.b. and no wheezing  Gastrointestinal: no abdominal pain, No C/D no N/V, no blood in stools  Genitourinary: no dysuria, no change in urinary frequency, no urinary hesitancy and no feelings of urinary urgency.   Musculoskeletal: no arthralgias,  no back pain and no myalgias.   Integumentary: no new skin lesions and no rashes.   Neurological: no difficulty walking, no headache, no limb weakness, no numbness and no tingling.   Psychiatric: no anxiety, no depression, no anhedonia and no substance use disorders.   Endocrine: no recent weight gain and no recent weight loss.   Hematologic/Lymphatic: no tendency for easy bruising and no swollen glands.  Objective   /74 (BP Location: Left arm)   Pulse 80   Temp 36.7 °C (98.1 °F) (Temporal)   Ht 1.575 m (5' 2\")   Wt 85.4 kg (188 lb 3.2 oz)   SpO2 100%   BMI 34.42 kg/m²     Physical Exam  gen- a & o x 3, nad, pleasant  heent- eomi, perrla, ear canals patent, TM's non-erythematous, no fluid, frontal and maxillary sinus's nontender  neck- supple, nontender, no palpable or enlarged nodes, no thyromegaly  heart- rrr, no murmurs  lungs- cta b/l , no w/r/r  chest- symmetric, nontender  ab- soft, nontender, no palpable organomegaly, postive bowel sounds  ex's- no c/c/e  neuro- CNs 2-12 grossly intact, full sensation and strength in all extremities    Assessment/Plan     1.  Medicare wellness.  No concerns on exam.  She had screening labs last month no concerns.  She is up-to-date with mammogram " up-to-date with all shots.    2.  Diabetes.  Recent control A1c of 7.3.  Continue current treatment plan.  She may need prior Auth for Trulicity.  Diabetes can be progressive (requiring more medications with time). If we can't regulate your Hemoglobin A1C with current meds we will add more in the future and consider Insulin therapy. Persistently elevated blood sugars can damage small blood vessels and nerves in the brain, eyes, heart, kidneys and feet. You should be getting yearly dilated eye exams. You should be inspecting your feet daily for any injuries. Also do not walk bare foot indoors or outside. Weight loss (to a BMI 30) and regular exercise (30 minutes per day) can greatly improve your need for medications for your diabetes. A hemoglobin A1c (3 month blood sugar average) will be checked 1-4 times per year to help monitor you blood sugar control.We are aggressive at treating diabetes because it is a significant risk factor for heart disease and stroke. Please reduce the amount of carbohydrates you are eating, and instead try eating more lean protein such as fish, turkey, and chicken. Please try to follow an ADA (American Diabetes Association) diet.    Continue routine follow-ups with specialist.  Call for any med refills needed.  Follow-up in 6 months for diabetes check 1 year for wellness

## 2024-01-03 NOTE — PATIENT INSTRUCTIONS
1.  Medicare wellness.  No concerns on exam.  She had screening labs last month no concerns.  She is up-to-date with mammogram up-to-date with all shots.    2.  Diabetes.  Recent control A1c of 7.3.  Continue current treatment plan.  She may need prior Auth for Trulicity.  Diabetes can be progressive (requiring more medications with time). If we can't regulate your Hemoglobin A1C with current meds we will add more in the future and consider Insulin therapy. Persistently elevated blood sugars can damage small blood vessels and nerves in the brain, eyes, heart, kidneys and feet. You should be getting yearly dilated eye exams. You should be inspecting your feet daily for any injuries. Also do not walk bare foot indoors or outside. Weight loss (to a BMI 30) and regular exercise (30 minutes per day) can greatly improve your need for medications for your diabetes. A hemoglobin A1c (3 month blood sugar average) will be checked 1-4 times per year to help monitor you blood sugar control.We are aggressive at treating diabetes because it is a significant risk factor for heart disease and stroke. Please reduce the amount of carbohydrates you are eating, and instead try eating more lean protein such as fish, turkey, and chicken. Please try to follow an ADA (American Diabetes Association) diet.    Continue routine follow-ups with specialist.  Call for any med refills needed.  Follow-up in 6 months for diabetes check 1 year for wellness

## 2024-01-10 DIAGNOSIS — Z00.00 ENCOUNTER FOR GENERAL ADULT MEDICAL EXAMINATION WITHOUT ABNORMAL FINDINGS: ICD-10-CM

## 2024-01-10 RX ORDER — LEVOTHYROXINE SODIUM 100 UG/1
100 TABLET ORAL DAILY
Qty: 90 TABLET | Refills: 3 | Status: SHIPPED | OUTPATIENT
Start: 2024-01-10

## 2024-01-24 DIAGNOSIS — E11.69 TYPE 2 DIABETES MELLITUS WITH OTHER SPECIFIED COMPLICATION, UNSPECIFIED WHETHER LONG TERM INSULIN USE (MULTI): ICD-10-CM

## 2024-01-24 RX ORDER — GLIPIZIDE 10 MG/1
10 TABLET, FILM COATED, EXTENDED RELEASE ORAL DAILY
Qty: 90 TABLET | Refills: 3 | Status: SHIPPED | OUTPATIENT
Start: 2024-01-24

## 2024-02-26 ENCOUNTER — OFFICE VISIT (OUTPATIENT)
Dept: PRIMARY CARE | Facility: CLINIC | Age: 79
End: 2024-02-26
Payer: MEDICARE

## 2024-02-26 VITALS
WEIGHT: 185.6 LBS | SYSTOLIC BLOOD PRESSURE: 100 MMHG | BODY MASS INDEX: 33.95 KG/M2 | DIASTOLIC BLOOD PRESSURE: 58 MMHG | HEART RATE: 81 BPM | OXYGEN SATURATION: 97 %

## 2024-02-26 DIAGNOSIS — E11.69 TYPE 2 DIABETES MELLITUS WITH OTHER SPECIFIED COMPLICATION, UNSPECIFIED WHETHER LONG TERM INSULIN USE (MULTI): Primary | ICD-10-CM

## 2024-02-26 PROBLEM — R09.81 NASAL CONGESTION: Status: ACTIVE | Noted: 2024-02-26

## 2024-02-26 PROBLEM — J34.89 NASAL DISCHARGE: Status: ACTIVE | Noted: 2024-02-26

## 2024-02-26 PROBLEM — H66.90 OTITIS MEDIA: Status: ACTIVE | Noted: 2023-03-07

## 2024-02-26 PROBLEM — N60.19 FIBROCYSTIC BREAST CHANGES: Status: ACTIVE | Noted: 2024-02-26

## 2024-02-26 PROBLEM — J01.90 ACUTE SINUSITIS: Status: ACTIVE | Noted: 2024-02-26

## 2024-02-26 PROBLEM — R51.9 FACIAL PAIN: Status: ACTIVE | Noted: 2024-02-26

## 2024-02-26 PROBLEM — N64.4 BREAST PAIN: Status: ACTIVE | Noted: 2024-02-26

## 2024-02-26 PROBLEM — Z86.69 HISTORY OF MACULAR DEGENERATION: Status: ACTIVE | Noted: 2024-02-26

## 2024-02-26 PROBLEM — R05.9 COUGH: Status: ACTIVE | Noted: 2024-02-26

## 2024-02-26 PROBLEM — R53.83 FATIGUE: Status: ACTIVE | Noted: 2024-02-26

## 2024-02-26 PROCEDURE — 3074F SYST BP LT 130 MM HG: CPT | Performed by: STUDENT IN AN ORGANIZED HEALTH CARE EDUCATION/TRAINING PROGRAM

## 2024-02-26 PROCEDURE — 3078F DIAST BP <80 MM HG: CPT | Performed by: STUDENT IN AN ORGANIZED HEALTH CARE EDUCATION/TRAINING PROGRAM

## 2024-02-26 PROCEDURE — 1036F TOBACCO NON-USER: CPT | Performed by: STUDENT IN AN ORGANIZED HEALTH CARE EDUCATION/TRAINING PROGRAM

## 2024-02-26 PROCEDURE — 1160F RVW MEDS BY RX/DR IN RCRD: CPT | Performed by: STUDENT IN AN ORGANIZED HEALTH CARE EDUCATION/TRAINING PROGRAM

## 2024-02-26 PROCEDURE — 1159F MED LIST DOCD IN RCRD: CPT | Performed by: STUDENT IN AN ORGANIZED HEALTH CARE EDUCATION/TRAINING PROGRAM

## 2024-02-26 PROCEDURE — 1125F AMNT PAIN NOTED PAIN PRSNT: CPT | Performed by: STUDENT IN AN ORGANIZED HEALTH CARE EDUCATION/TRAINING PROGRAM

## 2024-02-26 PROCEDURE — 99213 OFFICE O/P EST LOW 20 MIN: CPT | Performed by: STUDENT IN AN ORGANIZED HEALTH CARE EDUCATION/TRAINING PROGRAM

## 2024-02-26 RX ORDER — DULAGLUTIDE 0.75 MG/.5ML
0.75 INJECTION, SOLUTION SUBCUTANEOUS
Qty: 2 ML | Refills: 11 | Status: SHIPPED | OUTPATIENT
Start: 2024-02-26

## 2024-02-26 ASSESSMENT — ENCOUNTER SYMPTOMS: DEPRESSION: 0

## 2024-02-26 ASSESSMENT — PAIN SCALES - GENERAL: PAINLEVEL: 2

## 2024-02-26 NOTE — PATIENT INSTRUCTIONS
1.  Diabetes.  Will prescribe the lower dose of Trulicity due to national shortage of the 1.5 mg dose.    2.  Describes fatigue.  Reviewed all lab work from December no concerns.    3.  Right ear pressure congestion.  Likely eustachian tube dysfunction.  Advised on daily Flonase.

## 2024-02-26 NOTE — PROGRESS NOTES
Subjective   Patient ID: Cherrie Garcia is a 78 y.o. female who presents for Discuss medication (Unable to get Trulicity. Would like something else prescribed.), Earache (Right ear pain.), Fatigue, and Leg Pain (Thighs hurt.).    HPI comes in to describe several issues    Review of Systems  Constitutional: NO F, chills, or sweats  Eyes: no blurred vision or visual disturbance  ENT: Positive right ear pressure congestion started an airplane  Cardiovascular: no chest pain, no edema, no palps and no syncope.   Respiratory: no cough,no s.o.b. and no wheezing  Gastrointestinal: no abdominal pain, No C/D no N/V, no blood in stools  Genitourinary: no dysuria, no change in urinary frequency, no urinary hesitancy and no feelings of urinary urgency.   Musculoskeletal: no arthralgias,  no back pain and no myalgias.   Integumentary: no new skin lesions and no rashes.   Neurological: no difficulty walking, no headache, no limb weakness, no numbness and no tingling.   Psychiatric: no anxiety, no depression, no anhedonia and no substance use disorders.   Endocrine: no recent weight gain and no recent weight loss.   Hematologic/Lymphatic: Positive fatigue  Objective   /58 (BP Location: Left arm, Patient Position: Sitting, BP Cuff Size: Large adult)   Pulse 81   Wt 84.2 kg (185 lb 9.6 oz)   SpO2 97%   BMI 33.95 kg/m²     Physical Exam  gen- a & o x 3, nad, pleasant  heent- eomi, perrla, ear canals patent, TM's non-erythematous, mild fluid and bulging behind right TM  Assessment/Plan     1.  Diabetes.  Will prescribe the lower dose of Trulicity due to national shortage of the 1.5 mg dose.    2.  Describes fatigue.  Reviewed all lab work from December no concerns.    3.  Right ear pressure congestion.  Likely eustachian tube dysfunction.  Advised on daily Flonase.

## 2024-04-19 DIAGNOSIS — E11.69 TYPE 2 DIABETES MELLITUS WITH OTHER SPECIFIED COMPLICATION, UNSPECIFIED WHETHER LONG TERM INSULIN USE (MULTI): ICD-10-CM

## 2024-04-19 DIAGNOSIS — I10 PRIMARY HYPERTENSION: ICD-10-CM

## 2024-04-19 RX ORDER — VALSARTAN 80 MG/1
80 TABLET ORAL DAILY
Qty: 90 TABLET | Refills: 3 | Status: SHIPPED | OUTPATIENT
Start: 2024-04-19

## 2024-04-19 RX ORDER — METFORMIN HYDROCHLORIDE 500 MG/1
500 TABLET ORAL EVERY 12 HOURS
Qty: 180 TABLET | Refills: 3 | Status: SHIPPED | OUTPATIENT
Start: 2024-04-19 | End: 2024-04-29 | Stop reason: SDUPTHER

## 2024-04-22 DIAGNOSIS — E11.69 TYPE 2 DIABETES MELLITUS WITH OTHER SPECIFIED COMPLICATION, UNSPECIFIED WHETHER LONG TERM INSULIN USE (MULTI): Primary | ICD-10-CM

## 2024-04-22 RX ORDER — BLOOD-GLUCOSE METER
1 EACH MISCELLANEOUS 3 TIMES DAILY
Qty: 300 STRIP | Refills: 1 | Status: SHIPPED | OUTPATIENT
Start: 2024-04-22

## 2024-04-22 NOTE — TELEPHONE ENCOUNTER
Patient left a VM requesting a refill for one touch verio test strips to be sent to [facility] on file.

## 2024-04-27 ENCOUNTER — HOSPITAL ENCOUNTER (EMERGENCY)
Facility: HOSPITAL | Age: 79
Discharge: HOME | End: 2024-04-27
Attending: EMERGENCY MEDICINE
Payer: MEDICARE

## 2024-04-27 ENCOUNTER — APPOINTMENT (OUTPATIENT)
Dept: RADIOLOGY | Facility: HOSPITAL | Age: 79
End: 2024-04-27
Payer: MEDICARE

## 2024-04-27 ENCOUNTER — APPOINTMENT (OUTPATIENT)
Dept: CARDIOLOGY | Facility: HOSPITAL | Age: 79
End: 2024-04-27
Payer: MEDICARE

## 2024-04-27 ENCOUNTER — HOSPITAL ENCOUNTER (OUTPATIENT)
Dept: CARDIOLOGY | Facility: HOSPITAL | Age: 79
Discharge: HOME | End: 2024-04-27
Payer: MEDICARE

## 2024-04-27 VITALS
TEMPERATURE: 97.3 F | HEIGHT: 62 IN | RESPIRATION RATE: 16 BRPM | OXYGEN SATURATION: 98 % | BODY MASS INDEX: 33.86 KG/M2 | SYSTOLIC BLOOD PRESSURE: 117 MMHG | HEART RATE: 90 BPM | DIASTOLIC BLOOD PRESSURE: 68 MMHG | WEIGHT: 184 LBS

## 2024-04-27 DIAGNOSIS — R73.9 HYPERGLYCEMIA: ICD-10-CM

## 2024-04-27 DIAGNOSIS — R07.9 CHEST PAIN, UNSPECIFIED TYPE: Primary | ICD-10-CM

## 2024-04-27 DIAGNOSIS — I24.9 ACS (ACUTE CORONARY SYNDROME) (MULTI): ICD-10-CM

## 2024-04-27 LAB
ALBUMIN SERPL BCP-MCNC: 4.3 G/DL (ref 3.4–5)
ALP SERPL-CCNC: 35 U/L (ref 33–136)
ALT SERPL W P-5'-P-CCNC: 12 U/L (ref 7–45)
ANION GAP SERPL CALC-SCNC: 14 MMOL/L (ref 10–20)
APPEARANCE UR: CLEAR
AST SERPL W P-5'-P-CCNC: 14 U/L (ref 9–39)
BASOPHILS # BLD AUTO: 0.07 X10*3/UL (ref 0–0.1)
BASOPHILS NFR BLD AUTO: 0.8 %
BILIRUB SERPL-MCNC: 1.4 MG/DL (ref 0–1.2)
BILIRUB UR STRIP.AUTO-MCNC: NEGATIVE MG/DL
BUN SERPL-MCNC: 13 MG/DL (ref 6–23)
CALCIUM SERPL-MCNC: 9.6 MG/DL (ref 8.6–10.3)
CARDIAC TROPONIN I PNL SERPL HS: 3 NG/L (ref 0–13)
CARDIAC TROPONIN I PNL SERPL HS: 3 NG/L (ref 0–13)
CHLORIDE SERPL-SCNC: 98 MMOL/L (ref 98–107)
CO2 SERPL-SCNC: 25 MMOL/L (ref 21–32)
COLOR UR: ABNORMAL
CREAT SERPL-MCNC: 0.7 MG/DL (ref 0.5–1.05)
EGFRCR SERPLBLD CKD-EPI 2021: 88 ML/MIN/1.73M*2
EOSINOPHIL # BLD AUTO: 0.19 X10*3/UL (ref 0–0.4)
EOSINOPHIL NFR BLD AUTO: 2.3 %
ERYTHROCYTE [DISTWIDTH] IN BLOOD BY AUTOMATED COUNT: 11.6 % (ref 11.5–14.5)
GLUCOSE BLD MANUAL STRIP-MCNC: 187 MG/DL (ref 74–99)
GLUCOSE SERPL-MCNC: 192 MG/DL (ref 74–99)
GLUCOSE UR STRIP.AUTO-MCNC: NEGATIVE MG/DL
HCT VFR BLD AUTO: 43 % (ref 36–46)
HGB BLD-MCNC: 15.1 G/DL (ref 12–16)
IMM GRANULOCYTES # BLD AUTO: 0.04 X10*3/UL (ref 0–0.5)
IMM GRANULOCYTES NFR BLD AUTO: 0.5 % (ref 0–0.9)
KETONES UR STRIP.AUTO-MCNC: NEGATIVE MG/DL
LEUKOCYTE ESTERASE UR QL STRIP.AUTO: ABNORMAL
LYMPHOCYTES # BLD AUTO: 2.16 X10*3/UL (ref 0.8–3)
LYMPHOCYTES NFR BLD AUTO: 25.9 %
MAGNESIUM SERPL-MCNC: 1.82 MG/DL (ref 1.6–2.4)
MCH RBC QN AUTO: 32.1 PG (ref 26–34)
MCHC RBC AUTO-ENTMCNC: 35.1 G/DL (ref 32–36)
MCV RBC AUTO: 92 FL (ref 80–100)
MONOCYTES # BLD AUTO: 0.83 X10*3/UL (ref 0.05–0.8)
MONOCYTES NFR BLD AUTO: 10 %
NEUTROPHILS # BLD AUTO: 5.04 X10*3/UL (ref 1.6–5.5)
NEUTROPHILS NFR BLD AUTO: 60.5 %
NITRITE UR QL STRIP.AUTO: NEGATIVE
NRBC BLD-RTO: 0 /100 WBCS (ref 0–0)
PH UR STRIP.AUTO: 6 [PH]
PLATELET # BLD AUTO: 247 X10*3/UL (ref 150–450)
POTASSIUM SERPL-SCNC: 4 MMOL/L (ref 3.5–5.3)
PROT SERPL-MCNC: 6.9 G/DL (ref 6.4–8.2)
PROT UR STRIP.AUTO-MCNC: NEGATIVE MG/DL
RBC # BLD AUTO: 4.7 X10*6/UL (ref 4–5.2)
RBC # UR STRIP.AUTO: NEGATIVE /UL
RBC #/AREA URNS AUTO: NORMAL /HPF
SODIUM SERPL-SCNC: 133 MMOL/L (ref 136–145)
SP GR UR STRIP.AUTO: 1
SQUAMOUS #/AREA URNS AUTO: NORMAL /HPF
UROBILINOGEN UR STRIP.AUTO-MCNC: <2 MG/DL
WBC # BLD AUTO: 8.3 X10*3/UL (ref 4.4–11.3)
WBC #/AREA URNS AUTO: NORMAL /HPF

## 2024-04-27 PROCEDURE — 96361 HYDRATE IV INFUSION ADD-ON: CPT

## 2024-04-27 PROCEDURE — 93005 ELECTROCARDIOGRAM TRACING: CPT

## 2024-04-27 PROCEDURE — 71045 X-RAY EXAM CHEST 1 VIEW: CPT

## 2024-04-27 PROCEDURE — 80053 COMPREHEN METABOLIC PANEL: CPT | Performed by: EMERGENCY MEDICINE

## 2024-04-27 PROCEDURE — 36415 COLL VENOUS BLD VENIPUNCTURE: CPT | Performed by: EMERGENCY MEDICINE

## 2024-04-27 PROCEDURE — 96360 HYDRATION IV INFUSION INIT: CPT

## 2024-04-27 PROCEDURE — 85025 COMPLETE CBC W/AUTO DIFF WBC: CPT | Performed by: EMERGENCY MEDICINE

## 2024-04-27 PROCEDURE — 84484 ASSAY OF TROPONIN QUANT: CPT | Performed by: EMERGENCY MEDICINE

## 2024-04-27 PROCEDURE — 99283 EMERGENCY DEPT VISIT LOW MDM: CPT | Mod: 25

## 2024-04-27 PROCEDURE — 82947 ASSAY GLUCOSE BLOOD QUANT: CPT

## 2024-04-27 PROCEDURE — 2500000004 HC RX 250 GENERAL PHARMACY W/ HCPCS (ALT 636 FOR OP/ED): Performed by: EMERGENCY MEDICINE

## 2024-04-27 PROCEDURE — 81001 URINALYSIS AUTO W/SCOPE: CPT | Performed by: EMERGENCY MEDICINE

## 2024-04-27 PROCEDURE — 87086 URINE CULTURE/COLONY COUNT: CPT | Mod: PARLAB | Performed by: EMERGENCY MEDICINE

## 2024-04-27 PROCEDURE — 83735 ASSAY OF MAGNESIUM: CPT | Performed by: EMERGENCY MEDICINE

## 2024-04-27 PROCEDURE — 71045 X-RAY EXAM CHEST 1 VIEW: CPT | Performed by: RADIOLOGY

## 2024-04-27 PROCEDURE — 82947 ASSAY GLUCOSE BLOOD QUANT: CPT | Mod: 59

## 2024-04-27 RX ADMIN — SODIUM CHLORIDE, POTASSIUM CHLORIDE, SODIUM LACTATE AND CALCIUM CHLORIDE 1000 ML: 600; 310; 30; 20 INJECTION, SOLUTION INTRAVENOUS at 19:33

## 2024-04-27 ASSESSMENT — COLUMBIA-SUICIDE SEVERITY RATING SCALE - C-SSRS
1. IN THE PAST MONTH, HAVE YOU WISHED YOU WERE DEAD OR WISHED YOU COULD GO TO SLEEP AND NOT WAKE UP?: NO
6. HAVE YOU EVER DONE ANYTHING, STARTED TO DO ANYTHING, OR PREPARED TO DO ANYTHING TO END YOUR LIFE?: NO
2. HAVE YOU ACTUALLY HAD ANY THOUGHTS OF KILLING YOURSELF?: NO

## 2024-04-27 NOTE — ED PROVIDER NOTES
HPI   Chief Complaint   Patient presents with    Hyperglycemia     Patient glucose 187 in triage        Patient is a very pleasant 78-year-old female, medical history significant for non-insulin-dependent diabetes, costochondritis, coronary vascular disease with 2 stents, the presents to the emergency department with multiple complaints.  Patient states that her blood sugar was normally well-managed on 1.5 mg of Trulicity.  However, the  stopped and she has been switched to 0.75.  She states that she has been on this for about the past 10 days to 2 weeks.  Blood sugars have been slowly creeping up.  She states for the past few days, she has had some generalized malaise.  She was unsure if it was because of her blood sugar.  She states tonight, it was just under 250.  She states that she was thinking about this and began to have some chest tightness and dyspnea.  This is since resolved.  On arrival, blood sugar was 187.                          No data recorded                   Patient History   Past Medical History:   Diagnosis Date    Asymptomatic menopausal state 06/21/2019    Post-menopausal    Chondrocostal junction syndrome (tietze) 06/23/2016    Costochondritis    Chondrocostal junction syndrome (tietze) 08/31/2022    Costochondritis, acute    Chondrocostal junction syndrome (tietze) 11/02/2015    Costochondritis    Contusion of left knee, initial encounter 12/26/2019    Contusion of knee, left    Contusion of right knee, initial encounter 12/26/2019    Contusion of knee, right    Encounter for gynecological examination (general) (routine) without abnormal findings 05/11/2016    Encounter for gynecological examination    Essential (primary) hypertension 08/18/2013    Hypertension    Hypothyroidism, unspecified 08/18/2013    Hypothyroidism    Impacted cerumen, right ear 12/09/2020    Impacted cerumen of right ear    Left upper quadrant pain 10/22/2015    Left upper quadrant pain    Otalgia,  bilateral 12/09/2020    Otalgia, bilateral    Other chest pain 02/04/2020    Chest wall pain    Other chest pain 02/07/2017    Chest pain, atypical    Other conditions influencing health status 12/15/2021    History of cough    Other forms of dyspnea 06/23/2016    Dyspnea on exertion    Other specified disorders of eye and adnexa 03/31/2020    Eye swelling    Pain in right knee     Bilateral knee pain    Personal history of other diseases of the female genital tract 04/16/2014    History of breast pain    Personal history of other diseases of the musculoskeletal system and connective tissue     History of arthritis    Personal history of other diseases of the nervous system and sense organs 10/17/2017    History of otitis media    Personal history of other diseases of the nervous system and sense organs     History of macular degeneration    Personal history of other diseases of the respiratory system 09/25/2018    History of acute sinusitis    Personal history of other diseases of the respiratory system 02/22/2016    History of pharyngitis    Personal history of other diseases of the respiratory system 06/19/2017    History of acute sinusitis    Personal history of other diseases of the respiratory system 12/15/2021    History of acute sinusitis    Personal history of other diseases of the respiratory system 12/09/2020    History of nasal discharge    Personal history of other drug therapy 09/25/2018    History of influenza vaccination    Personal history of other drug therapy 02/16/2018    History of influenza vaccination    Personal history of other endocrine, nutritional and metabolic disease 05/14/2020    History of diabetes mellitus    Personal history of other specified conditions 12/14/2020    History of fibrocystic disease of breast    Personal history of other specified conditions 10/19/2015    History of fatigue    Personal history of other specified conditions 06/23/2016    History of chest pain     Personal history of other specified conditions 2020    History of nasal congestion    Personal history of other specified conditions 2020    History of facial pain    Rash and other nonspecific skin eruption 2016    Rash, skin    Trochanteric bursitis, left hip 2021    Trochanteric bursitis of left hip    Unspecified otitis externa, unspecified ear 2022    Otitis externa     Past Surgical History:   Procedure Laterality Date    BREAST BIOPSY  2020    Biopsy Breast Percutaneous Needle Core     SECTION, CLASSIC  2014     Section    CORONARY ANGIOPLASTY WITH STENT PLACEMENT  06/15/2016    Cath Placement Of Stent 1    HYSTERECTOMY  2014    Hysterectomy    OTHER SURGICAL HISTORY  10/21/2019    Cataract surgery    OTHER SURGICAL HISTORY  10/21/2019    Knee replacement     Family History   Problem Relation Name Age of Onset    Diabetes Mother      Glaucoma Mother      Hypertension Mother      Hypothyroidism Mother      Other (cardiac disorder) Father      Diabetes Father      Glaucoma Father      Hypertension Father      Hypothyroidism Father      Cancer Father       Social History     Tobacco Use    Smoking status: Never    Smokeless tobacco: Never   Substance Use Topics    Alcohol use: Not Currently    Drug use: Never       Physical Exam   ED Triage Vitals [24 1857]   Temperature Heart Rate Respirations BP   36.3 °C (97.3 °F) 90 16 125/71      Pulse Ox Temp src Heart Rate Source Patient Position   98 % -- -- --      BP Location FiO2 (%)     -- --       Physical Exam  Vitals and nursing note reviewed.   Constitutional:       General: She is not in acute distress.     Appearance: She is well-developed.   HENT:      Head: Normocephalic and atraumatic.   Eyes:      Extraocular Movements: Extraocular movements intact.      Conjunctiva/sclera: Conjunctivae normal.      Pupils: Pupils are equal, round, and reactive to light.   Cardiovascular:      Rate and  Rhythm: Normal rate and regular rhythm.      Heart sounds: No murmur heard.  Pulmonary:      Effort: Pulmonary effort is normal. No respiratory distress.      Breath sounds: Normal breath sounds.   Abdominal:      Palpations: Abdomen is soft.      Tenderness: There is no abdominal tenderness.   Musculoskeletal:         General: No swelling.      Cervical back: Normal range of motion and neck supple.   Skin:     General: Skin is warm and dry.      Capillary Refill: Capillary refill takes less than 2 seconds.   Neurological:      General: No focal deficit present.      Mental Status: She is alert and oriented to person, place, and time.   Psychiatric:         Mood and Affect: Mood normal.         ED Course & MDM   ED Course as of 04/29/24 1018   Sat Apr 27, 2024 1936 CBC unremarkable. [MK]   1958 Metabolic panel demonstrates normal renal function.  Mild elevation of glucose.  Mild decrease sodium.  Magnesium normal. [MK]   1958 Chest x-ray demonstrates normal cardiac silhouette, no traits, no effusions. [MK]   2011 Initial troponin normal at 3. [MK]      ED Course User Index  [MK] Chidi Sanders MD         Diagnoses as of 04/29/24 1018   Chest pain, unspecified type   Hyperglycemia       Medical Decision Making  Medical Decision Making: Patient states her major complaint brought her in was that she had chest tightness.  She thinks it may have been secondary to anxiety.  She does have history of coronary vascular disease.  The chest pain was fleeting and resolved by the time she arrived here.  Blood sugar was obtained in triage which was 187.  I do not feel this needs acutely treated.  Chest pain is worked up.  EKG does not show any evidence of acute ischemia.  Chest x-ray was unchanged.  Labs were at the patient's baseline.  Initial cardiac enzymes are normal.  I did have long conversation with the patient.  I do feel that she would be safe for outpatient therapy.  Repeat troponin will be obtained.  As long as  this is negative, patient will be discharged.    EKG interpreted by myself (ED attending physician): Sinus rhythm.  Rate of 86.  Mild left axis deviation.  No acute ischemia.  No STEMI.    Differential Diagnoses Considered: ACS, NSTEMI, hyperglycemia, anxiety    Chronic Medical Conditions Significantly Affecting Care: Coronary vascular disease, diabetes    External Records Reviewed: I reviewed recent and relevant outside records including: Outpatient medication reconciliation    Independent Interpretation of Studies:  I independently interpreted: Chest x-ray obtained reviewed    Escalation of Care:  Appropriate for outpatient management    Social Determinants of Health Significantly Affecting Care:  No social determinant    Prescription Drug Consideration: This aspirin    Diagnostic testing considered: Noncontributory              Procedure  Procedures     Chidi Sanders MD  04/29/24 4918

## 2024-04-28 LAB — HOLD SPECIMEN: NORMAL

## 2024-04-29 ENCOUNTER — OFFICE VISIT (OUTPATIENT)
Dept: PRIMARY CARE | Facility: CLINIC | Age: 79
End: 2024-04-29
Payer: MEDICARE

## 2024-04-29 VITALS — DIASTOLIC BLOOD PRESSURE: 64 MMHG | BODY MASS INDEX: 33.87 KG/M2 | SYSTOLIC BLOOD PRESSURE: 104 MMHG | WEIGHT: 185.2 LBS

## 2024-04-29 DIAGNOSIS — E11.69 TYPE 2 DIABETES MELLITUS WITH OTHER SPECIFIED COMPLICATION, UNSPECIFIED WHETHER LONG TERM INSULIN USE (MULTI): Primary | ICD-10-CM

## 2024-04-29 LAB
BACTERIA UR CULT: NORMAL
HBA1C MFR BLD: 8.8 % (ref 4.2–6.5)

## 2024-04-29 PROCEDURE — 1159F MED LIST DOCD IN RCRD: CPT | Performed by: STUDENT IN AN ORGANIZED HEALTH CARE EDUCATION/TRAINING PROGRAM

## 2024-04-29 PROCEDURE — 99213 OFFICE O/P EST LOW 20 MIN: CPT | Performed by: STUDENT IN AN ORGANIZED HEALTH CARE EDUCATION/TRAINING PROGRAM

## 2024-04-29 PROCEDURE — 3078F DIAST BP <80 MM HG: CPT | Performed by: STUDENT IN AN ORGANIZED HEALTH CARE EDUCATION/TRAINING PROGRAM

## 2024-04-29 PROCEDURE — 1125F AMNT PAIN NOTED PAIN PRSNT: CPT | Performed by: STUDENT IN AN ORGANIZED HEALTH CARE EDUCATION/TRAINING PROGRAM

## 2024-04-29 PROCEDURE — 83036 HEMOGLOBIN GLYCOSYLATED A1C: CPT | Mod: CLIA WAIVED TEST | Performed by: STUDENT IN AN ORGANIZED HEALTH CARE EDUCATION/TRAINING PROGRAM

## 2024-04-29 PROCEDURE — 1036F TOBACCO NON-USER: CPT | Performed by: STUDENT IN AN ORGANIZED HEALTH CARE EDUCATION/TRAINING PROGRAM

## 2024-04-29 PROCEDURE — 1160F RVW MEDS BY RX/DR IN RCRD: CPT | Performed by: STUDENT IN AN ORGANIZED HEALTH CARE EDUCATION/TRAINING PROGRAM

## 2024-04-29 PROCEDURE — 3074F SYST BP LT 130 MM HG: CPT | Performed by: STUDENT IN AN ORGANIZED HEALTH CARE EDUCATION/TRAINING PROGRAM

## 2024-04-29 RX ORDER — METFORMIN HYDROCHLORIDE 500 MG/1
1000 TABLET ORAL
Qty: 360 TABLET | Refills: 3 | Status: SHIPPED | OUTPATIENT
Start: 2024-04-29 | End: 2025-04-29

## 2024-04-29 ASSESSMENT — PAIN SCALES - GENERAL: PAINLEVEL: 2

## 2024-04-29 ASSESSMENT — ENCOUNTER SYMPTOMS: DEPRESSION: 1

## 2024-04-29 NOTE — PROGRESS NOTES
Subjective   Patient ID: Cherrie Garcia is a 78 y.o. female who presents for elevated glucose levels..    [facility] comes in for glucose check.    Review of Systems  Constitutional: NO F, chills, or sweats  Eyes: no blurred vision or visual disturbance  ENT: no hearing loss, no congestion, no nasal discharge, no hoarseness and no sore throat.   Cardiovascular: no chest pain, no edema, no palps and no syncope.   Respiratory: no cough,no s.o.b. and no wheezing  Gastrointestinal: no abdominal pain, No C/D no N/V, no blood in stools  Genitourinary: no dysuria, no change in urinary frequency, no urinary hesitancy and no feelings of urinary urgency.   Musculoskeletal: no arthralgias,  no back pain and no myalgias.   Integumentary: no new skin lesions and no rashes.   Neurological: no difficulty walking, no headache, no limb weakness, no numbness and no tingling.   Psychiatric: no anxiety, no depression, no anhedonia and no substance use disorders.   Endocrine: Elevated glucose readings the past 1 month  Objective   /64 (BP Location: Right arm, Patient Position: Sitting, BP Cuff Size: Large adult)   Wt 84 kg (185 lb 3.2 oz)   BMI 33.87 kg/m²     Physical Exam  gen- a & o x 3, nad, pleasant    Assessment/Plan     1.  ER follow-up for diabetes.  In office hemoglobin A1c of 8.8 this is significantly elevated.  Trouble with getting the proper dose of Trulicity due to national shortage.  While she is on the 0.75 of Trulicity, we will ask her to increase her metformin to 1000 mg twice per day.  We will then be able to lower her metformin once the Trulicity 1.5 mg is in stock.

## 2024-04-29 NOTE — PATIENT INSTRUCTIONS
1.  ER follow-up for diabetes.  In office hemoglobin A1c of 8.8 this is significantly elevated.  Trouble with getting the proper dose of Trulicity due to national shortage.  While she is on the 0.75 of Trulicity, we will ask her to increase her metformin to 1000 mg twice per day.  We will then be able to lower her metformin once the Trulicity 1.5 mg is in stock.

## 2024-05-03 LAB
ATRIAL RATE: 86 BPM
ATRIAL RATE: 97 BPM
P AXIS: 43 DEGREES
P AXIS: 8 DEGREES
PR INTERVAL: 131 MS
PR INTERVAL: 167 MS
Q ONSET: 252 MS
Q ONSET: 255 MS
QRS COUNT: 13 BEATS
QRS COUNT: 16 BEATS
QRS DURATION: 63 MS
QRS DURATION: 79 MS
QT INTERVAL: 353 MS
QT INTERVAL: 499 MS
QTC CALCULATION(BAZETT): 449 MS
QTC CALCULATION(BAZETT): 597 MS
QTC FREDERICIA: 414 MS
QTC FREDERICIA: 563 MS
R AXIS: -47 DEGREES
R AXIS: 40 DEGREES
T AXIS: -7 DEGREES
T OFFSET: 428 MS
T OFFSET: 505 MS
VENTRICULAR RATE: 86 BPM
VENTRICULAR RATE: 97 BPM

## 2024-05-03 PROCEDURE — 93005 ELECTROCARDIOGRAM TRACING: CPT

## 2024-06-26 ENCOUNTER — TELEPHONE (OUTPATIENT)
Dept: PRIMARY CARE | Facility: CLINIC | Age: 79
End: 2024-06-26
Payer: MEDICARE

## 2024-06-26 NOTE — TELEPHONE ENCOUNTER
Patient called asking if its okay to go to a Childrens birthday party this Saturday. The birthday boy was positive for hand, foot, and mouth disease this past Monday

## 2024-07-09 ENCOUNTER — APPOINTMENT (OUTPATIENT)
Dept: PRIMARY CARE | Facility: CLINIC | Age: 79
End: 2024-07-09
Payer: MEDICARE

## 2024-07-12 ENCOUNTER — HOSPITAL ENCOUNTER (EMERGENCY)
Facility: HOSPITAL | Age: 79
Discharge: HOME | End: 2024-07-12
Attending: EMERGENCY MEDICINE
Payer: MEDICARE

## 2024-07-12 VITALS
HEART RATE: 95 BPM | SYSTOLIC BLOOD PRESSURE: 139 MMHG | WEIGHT: 183 LBS | DIASTOLIC BLOOD PRESSURE: 70 MMHG | OXYGEN SATURATION: 98 % | TEMPERATURE: 97 F | HEIGHT: 62 IN | BODY MASS INDEX: 33.68 KG/M2

## 2024-07-12 DIAGNOSIS — H92.03 OTALGIA OF BOTH EARS: Primary | ICD-10-CM

## 2024-07-12 PROCEDURE — 99282 EMERGENCY DEPT VISIT SF MDM: CPT

## 2024-07-12 ASSESSMENT — PAIN DESCRIPTION - LOCATION: LOCATION: EAR

## 2024-07-12 ASSESSMENT — COLUMBIA-SUICIDE SEVERITY RATING SCALE - C-SSRS
6. HAVE YOU EVER DONE ANYTHING, STARTED TO DO ANYTHING, OR PREPARED TO DO ANYTHING TO END YOUR LIFE?: NO
1. IN THE PAST MONTH, HAVE YOU WISHED YOU WERE DEAD OR WISHED YOU COULD GO TO SLEEP AND NOT WAKE UP?: NO
2. HAVE YOU ACTUALLY HAD ANY THOUGHTS OF KILLING YOURSELF?: NO

## 2024-07-12 ASSESSMENT — PAIN - FUNCTIONAL ASSESSMENT: PAIN_FUNCTIONAL_ASSESSMENT: 0-10

## 2024-07-12 ASSESSMENT — PAIN SCALES - GENERAL: PAINLEVEL_OUTOF10: 9

## 2024-07-12 NOTE — ED PROVIDER NOTES
Emergency Department Provider Note          History of Present Illness     CC: No chief complaint on file.     HPI: Patient is a 78-year-old female with history of non-insulin-dependent diabetes, costochondritis, CAD with 2 stents, presenting to the emergency department today for ear pain.  Patient reports for the past 2 weeks now she has had bilateral ear pain worse to the right greater than left.  Reports she never had pain like this before.  Talked to her dentist who prescribed her 10 days of Augmentin.  She reports no improvement in her symptoms after these antibiotics.  Still taking some random antibiotics she has at home including Bactrim and clarithromycin as well.  Reports intermittent headache as well.  Denies fevers, chills, chest pain, shortness of breath,  abdominal pain, or changes in urination/stool for us.  No hearing changes noted.  No dizziness.    Records Reviewed: Recent available ED and inpatient notes reviewed in EMR.    PMHx/PSHx:  Per HPI.   - has a past medical history of Asymptomatic menopausal state, Chondrocostal junction syndrome (tietze), Chondrocostal junction syndrome (tietze), Chondrocostal junction syndrome (tietze), Contusion of left knee, initial encounter, Contusion of right knee, initial encounter, Encounter for gynecological examination (general) (routine) without abnormal findings, Essential (primary) hypertension, Hypothyroidism, unspecified, Impacted cerumen, right ear, Left upper quadrant pain, Otalgia, bilateral, Other chest pain, Other chest pain, Other conditions influencing health status, Other forms of dyspnea, Other specified disorders of eye and adnexa, Pain in right knee, Personal history of other diseases of the female genital tract, Personal history of other diseases of the musculoskeletal system and connective tissue, Personal history of other diseases of the nervous system and sense organs, Personal history of other diseases of the nervous system and sense  organs, Personal history of other diseases of the respiratory system, Personal history of other diseases of the respiratory system, Personal history of other diseases of the respiratory system, Personal history of other diseases of the respiratory system, Personal history of other diseases of the respiratory system, Personal history of other drug therapy, Personal history of other drug therapy, Personal history of other endocrine, nutritional and metabolic disease, Personal history of other specified conditions, Personal history of other specified conditions, Personal history of other specified conditions, Personal history of other specified conditions, Personal history of other specified conditions, Rash and other nonspecific skin eruption, Trochanteric bursitis, left hip, and Unspecified otitis externa, unspecified ear.  - has a past surgical history that includes Breast biopsy (2020); Other surgical history (10/21/2019); Other surgical history (10/21/2019); Coronary angioplasty with stent (06/15/2016);  section, classic (2014); and Hysterectomy (2014).  - has Anxiety; Coronary artery arteriosclerosis; Costochondritis, acute; Dyslipidemia; GERD (gastroesophageal reflux disease); HTN (hypertension); Hypothyroidism; Vitamin D deficiency; Otitis media; Trochanteric bursitis of left hip; Type 2 diabetes mellitus (Multi); Lumbar neuritis; Herniated lumbar intervertebral disc; Chronic low back pain; Arthritis; Chronic pain of left knee; Heel pain; Referred otalgia; Low back pain; Coronary artery disease involving native heart without angina pectoris; Gastroesophageal reflux disease; Essential hypertension; Hypertension; Acute sinusitis; Breast pain; Facial pain; Fatigue; Cough; Fibrocystic breast changes; History of macular degeneration; Nasal congestion; and Nasal discharge on their problem list.    Medications:  Current Outpatient Medications   Medication Instructions    aspirin 81 mg EC  tablet 1 tablet, oral, Daily    blood sugar diagnostic (OneTouch Verio test strips) strip 1 strip, transdermal, 3 times daily    cholecalciferol (VITAMIN D-3) 2,000 Units, oral, Daily    glipiZIDE XL (GLUCOTROL XL) 10 mg, oral, Daily    lancets misc Test once daily as directed    levothyroxine (SYNTHROID, LEVOXYL) 100 mcg, oral, Daily    metFORMIN (GLUCOPHAGE) 1,000 mg, oral, 2 times daily (morning and late afternoon)    mometasone (Elocon) 0.1 % ointment APPLY TO AFFECTED AREAS ON NIPPLES TWICE A DAY X2 WEEKS PER MONTH. REPEAT AS NEEDED.    simvastatin (Zocor) 20 mg tablet 1 tablet, oral, Daily    Trulicity 0.75 mg, subcutaneous, Once Weekly    valsartan (DIOVAN) 80 mg, oral, Daily    vit A/vit C/vit E/zinc/copper (VITAMINS A,C,E-ZINC-COPPER ORAL) oral        Allergies:  Amoxicillin-pot clavulanate, Diclofenac, Diclofenac potassium, Mucinex [guaifenesin], Penicillin, Penicillins, and Pseudoephedrine-guaifenesin    Social History:  - Tobacco:  reports that she has never smoked. She has never used smokeless tobacco.   - Alcohol:  reports that she does not currently use alcohol.   - Illicit Drugs:  reports no history of drug use.     ROS:  Per HPI.       Physical Exam     Triage Vitals:  T 36.1 °C (97 °F)  HR 95  /70  RR    O2 98 % None (Room air)    General: Patient resting comfortably in bed, no acute distress, breathing easily, well appearing, and appropriately conversational without confusion or gross mental status changes.  Head: Normocephalic. Atraumatic.  Neck: No meningismus. No midline cervical spine tenderness with palpation. FROM. No gross masses.   Eyes: EOMI. No scleral icterus or injection.  ENT: Moist mucous membranes, no apparent trauma or lesions. Ears clear bilaterally, no erythema or external swelling.  CV: Regular rhythm. No murmurs, rubs, gallops appreciated. 2+ radial pulses bilaterally.  Resp: Clear to auscultation bilaterally. No respiratory distress.   GI: Soft, non-distended. No  tenderness with palpation. No rebound tenderness or guarding. No palpable masses.  : No suprapubic or CVA tenderness.  MSK: Full ROM in bilateral upper and lower extremities. No gross step offs or deformities.  EXT: No peripheral edema, contusions, or wounds.  Skin: Warm and dry, no rashes or lesions  Neuro: Alert and oriented. Cranial nerves II-XII grossly intact. No focal neurological deficits. Motor and sensation intact throughout. Speech fluent.  Psych: Appropriate mood and behavior, converses and responds appropriately.          Mariely Coma Scale Score: 15                    Medical Decision Making & ED Course     Results: Relevant laboratory and radiographic results were reviewed and independently interpreted by myself.  As necessary, they are commented on in the ED Course.      Medical Decision Making   Patient is a an 79-year-old female presented emergency department today for ear pain.  On arrival, vital signs within normal limits, afebrile for us.  On examination, patient appears otherwise clinically well.  Able to ambulate without neurologic deficits.  Lower concern for acute strokelike process.  On examination, has clear ears bilaterally, lower concern for acute otitis media/externa today. No mastoid tenderness or swelling. Do not think antibiotics are indicated at this time.  Treated symptomatically with ibuprofen in the emergency department.  Will be discharged home stable condition with ENT referral and strict return precautions.    Diagnoses as of 07/12/24 1026   Otalgia of both ears       Social Determinants Limiting Care:  None identified    Disposition:   Discharge    Dagoberto Knight MD  Emergency Medicine PGY3      Procedures ? [employer] last updated 7/12/2024 10:26 AM        Dagoberto Knight MD  Resident  07/12/24 1026

## 2024-07-12 NOTE — ED TRIAGE NOTES
Pt came in c/o ear pain. Pt states that she has an ear infection in right ear and now left ear is starting to hurt. Pt has a headache,  pt denies nausea vomiting, and Pt has a stiff neck.

## 2024-07-30 ENCOUNTER — APPOINTMENT (OUTPATIENT)
Dept: PRIMARY CARE | Facility: CLINIC | Age: 79
End: 2024-07-30
Payer: MEDICARE

## 2024-08-12 ENCOUNTER — LAB (OUTPATIENT)
Dept: LAB | Facility: LAB | Age: 79
End: 2024-08-12
Payer: MEDICARE

## 2024-08-12 ENCOUNTER — APPOINTMENT (OUTPATIENT)
Dept: PRIMARY CARE | Facility: CLINIC | Age: 79
End: 2024-08-12
Payer: MEDICARE

## 2024-08-12 VITALS
SYSTOLIC BLOOD PRESSURE: 101 MMHG | HEIGHT: 62 IN | WEIGHT: 185.4 LBS | OXYGEN SATURATION: 98 % | RESPIRATION RATE: 18 BRPM | HEART RATE: 86 BPM | BODY MASS INDEX: 34.12 KG/M2 | DIASTOLIC BLOOD PRESSURE: 67 MMHG

## 2024-08-12 DIAGNOSIS — E03.8 HYPOTHYROIDISM DUE TO HASHIMOTO'S THYROIDITIS: ICD-10-CM

## 2024-08-12 DIAGNOSIS — E55.9 VITAMIN D DEFICIENCY: ICD-10-CM

## 2024-08-12 DIAGNOSIS — E06.3 HYPOTHYROIDISM DUE TO HASHIMOTO'S THYROIDITIS: ICD-10-CM

## 2024-08-12 DIAGNOSIS — E11.9 TYPE 2 DIABETES MELLITUS WITHOUT COMPLICATION, WITHOUT LONG-TERM CURRENT USE OF INSULIN (MULTI): ICD-10-CM

## 2024-08-12 DIAGNOSIS — E11.9 TYPE 2 DIABETES MELLITUS WITHOUT COMPLICATION, WITHOUT LONG-TERM CURRENT USE OF INSULIN (MULTI): Primary | ICD-10-CM

## 2024-08-12 LAB
25(OH)D3 SERPL-MCNC: 40 NG/ML (ref 30–100)
CREAT UR-MCNC: 70.3 MG/DL (ref 20–320)
EST. AVERAGE GLUCOSE BLD GHB EST-MCNC: 171 MG/DL
HBA1C MFR BLD: 7.6 %
MICROALBUMIN UR-MCNC: <7 MG/L
MICROALBUMIN/CREAT UR: NORMAL MG/G{CREAT}
T4 FREE SERPL-MCNC: 1.41 NG/DL (ref 0.78–1.48)
TSH SERPL-ACNC: 4.41 MIU/L (ref 0.44–3.98)

## 2024-08-12 PROCEDURE — 1160F RVW MEDS BY RX/DR IN RCRD: CPT | Performed by: INTERNAL MEDICINE

## 2024-08-12 PROCEDURE — 3074F SYST BP LT 130 MM HG: CPT | Performed by: INTERNAL MEDICINE

## 2024-08-12 PROCEDURE — 99213 OFFICE O/P EST LOW 20 MIN: CPT | Performed by: INTERNAL MEDICINE

## 2024-08-12 PROCEDURE — 82570 ASSAY OF URINE CREATININE: CPT

## 2024-08-12 PROCEDURE — 36415 COLL VENOUS BLD VENIPUNCTURE: CPT

## 2024-08-12 PROCEDURE — 83036 HEMOGLOBIN GLYCOSYLATED A1C: CPT

## 2024-08-12 PROCEDURE — 3078F DIAST BP <80 MM HG: CPT | Performed by: INTERNAL MEDICINE

## 2024-08-12 PROCEDURE — 1159F MED LIST DOCD IN RCRD: CPT | Performed by: INTERNAL MEDICINE

## 2024-08-12 PROCEDURE — 82043 UR ALBUMIN QUANTITATIVE: CPT

## 2024-08-12 PROCEDURE — 84439 ASSAY OF FREE THYROXINE: CPT

## 2024-08-12 PROCEDURE — 84443 ASSAY THYROID STIM HORMONE: CPT

## 2024-08-12 PROCEDURE — 82306 VITAMIN D 25 HYDROXY: CPT

## 2024-08-12 ASSESSMENT — ENCOUNTER SYMPTOMS
BLOOD IN STOOL: 0
HEADACHES: 1
SHORTNESS OF BREATH: 0
FATIGUE: 1
CONSTIPATION: 0
DIZZINESS: 0
SLEEP DISTURBANCE: 0

## 2024-08-12 NOTE — PROGRESS NOTES
"Subjective   Patient ID: Cherrie Garcia is a 79 y.o. female who presents for Follow-up (A1C Testing ).    Last A1c was 8.8, Dr. San tried increasing trulicity dose but there was no availability so had to go back down. Did have metformin dose increased at least.        Review of Systems   Constitutional:  Positive for fatigue.   Respiratory:  Negative for shortness of breath.    Cardiovascular:  Negative for chest pain.   Gastrointestinal:  Negative for blood in stool and constipation.   Neurological:  Positive for headaches. Negative for dizziness.   Psychiatric/Behavioral:  Negative for sleep disturbance.        /67 (BP Location: Right arm, Patient Position: Sitting, BP Cuff Size: Adult)   Pulse 86   Resp 18   Ht 1.568 m (5' 1.74\")   Wt 84.1 kg (185 lb 6.4 oz)   SpO2 98%   BMI 34.20 kg/m²   Objective   Physical Exam  Constitutional:       General: She is not in acute distress.     Appearance: She is obese. She is not ill-appearing, toxic-appearing or diaphoretic.   HENT:      Head: Normocephalic and atraumatic.   Eyes:      Conjunctiva/sclera: Conjunctivae normal.   Cardiovascular:      Rate and Rhythm: Normal rate and regular rhythm.      Heart sounds: No murmur heard.     No friction rub. No gallop.   Pulmonary:      Effort: Pulmonary effort is normal. No respiratory distress.      Breath sounds: No stridor. No wheezing, rhonchi or rales.   Feet:      Right foot:      Skin integrity: Skin integrity normal. No ulcer, skin breakdown or dry skin.      Left foot:      Skin integrity: Skin integrity normal. No ulcer, skin breakdown or dry skin.   Neurological:      Mental Status: She is alert.         Assessment/Plan   Problem List Items Addressed This Visit             ICD-10-CM    Hypothyroidism E03.9    Relevant Orders    Tsh With Reflex To Free T4 If Abnormal    Vitamin D deficiency E55.9    Relevant Orders    Vitamin D 25-Hydroxy,Total (for eval of Vitamin D levels)    Type 2 diabetes mellitus " (Multi) - Primary E11.9     -Taking metformin, glipizide, and trulicity.  -Repeating A1c and urine albumin today.  -Referring to pharmacy to see what else can be done for regimen. Pt agreeable.         Relevant Orders    Referral to Clinical Pharmacy    Hemoglobin A1c    Albumin-Creatinine Ratio, Random Urine   -Testing for TSH and vit D lvl as well.  -Offered DEXA; pt states life has been busy. Wants to follow up at next visit.  -Will see back in January for MAW.         Praful Bernabe MD 08/12/24 3:21 PM

## 2024-08-12 NOTE — ASSESSMENT & PLAN NOTE
-Taking metformin, glipizide, and trulicity.  -Repeating A1c and urine albumin today.  -Referring to pharmacy to see what else can be done for regimen. Pt agreeable.

## 2024-08-14 DIAGNOSIS — E11.69 TYPE 2 DIABETES MELLITUS WITH OTHER SPECIFIED COMPLICATION, UNSPECIFIED WHETHER LONG TERM INSULIN USE (MULTI): Primary | ICD-10-CM

## 2024-08-27 DIAGNOSIS — E11.69 TYPE 2 DIABETES MELLITUS WITH OTHER SPECIFIED COMPLICATION, UNSPECIFIED WHETHER LONG TERM INSULIN USE (MULTI): ICD-10-CM

## 2024-08-27 RX ORDER — FLUCONAZOLE 150 MG/1
TABLET ORAL
COMMUNITY
Start: 2024-07-10

## 2024-08-27 RX ORDER — BLOOD-GLUCOSE METER
1 EACH MISCELLANEOUS 3 TIMES DAILY
Qty: 300 STRIP | Refills: 1 | Status: SHIPPED | OUTPATIENT
Start: 2024-08-27

## 2024-08-27 RX ORDER — CELECOXIB 100 MG/1
1 CAPSULE ORAL 2 TIMES DAILY
COMMUNITY
Start: 2024-08-01

## 2024-08-27 RX ORDER — ORPHENADRINE CITRATE 100 MG/1
TABLET, EXTENDED RELEASE ORAL
COMMUNITY
Start: 2022-09-28

## 2024-08-27 RX ORDER — GABAPENTIN 300 MG/1
CAPSULE ORAL
COMMUNITY
Start: 2023-04-25

## 2024-08-27 RX ORDER — MENTHOL/ZINC OXIDE 0.44-20.6%
OINTMENT (GRAM) TOPICAL
COMMUNITY
Start: 2024-07-10

## 2024-09-04 ENCOUNTER — OFFICE VISIT (OUTPATIENT)
Dept: OTOLARYNGOLOGY | Facility: CLINIC | Age: 79
End: 2024-09-04
Payer: MEDICARE

## 2024-09-04 VITALS
WEIGHT: 185 LBS | HEIGHT: 62 IN | OXYGEN SATURATION: 98 % | SYSTOLIC BLOOD PRESSURE: 118 MMHG | HEART RATE: 84 BPM | BODY MASS INDEX: 34.04 KG/M2 | DIASTOLIC BLOOD PRESSURE: 72 MMHG | RESPIRATION RATE: 16 BRPM | TEMPERATURE: 97.5 F

## 2024-09-04 DIAGNOSIS — H92.01 RIGHT EAR PAIN: ICD-10-CM

## 2024-09-04 DIAGNOSIS — R44.8 FACIAL PRESSURE: ICD-10-CM

## 2024-09-04 DIAGNOSIS — J34.89 NASAL DRAINAGE: ICD-10-CM

## 2024-09-04 DIAGNOSIS — H92.03 OTALGIA OF BOTH EARS: ICD-10-CM

## 2024-09-04 DIAGNOSIS — H61.21 EXCESSIVE CERUMEN IN RIGHT EAR CANAL: Primary | ICD-10-CM

## 2024-09-04 DIAGNOSIS — J02.9 SORE THROAT: ICD-10-CM

## 2024-09-04 PROCEDURE — 1036F TOBACCO NON-USER: CPT | Performed by: NURSE PRACTITIONER

## 2024-09-04 PROCEDURE — 99213 OFFICE O/P EST LOW 20 MIN: CPT | Performed by: NURSE PRACTITIONER

## 2024-09-04 PROCEDURE — 1159F MED LIST DOCD IN RCRD: CPT | Performed by: NURSE PRACTITIONER

## 2024-09-04 PROCEDURE — 99203 OFFICE O/P NEW LOW 30 MIN: CPT | Performed by: NURSE PRACTITIONER

## 2024-09-04 PROCEDURE — 1126F AMNT PAIN NOTED NONE PRSNT: CPT | Performed by: NURSE PRACTITIONER

## 2024-09-04 PROCEDURE — 3074F SYST BP LT 130 MM HG: CPT | Performed by: NURSE PRACTITIONER

## 2024-09-04 PROCEDURE — 3078F DIAST BP <80 MM HG: CPT | Performed by: NURSE PRACTITIONER

## 2024-09-04 ASSESSMENT — COLUMBIA-SUICIDE SEVERITY RATING SCALE - C-SSRS
2. HAVE YOU ACTUALLY HAD ANY THOUGHTS OF KILLING YOURSELF?: NO
6. HAVE YOU EVER DONE ANYTHING, STARTED TO DO ANYTHING, OR PREPARED TO DO ANYTHING TO END YOUR LIFE?: NO
1. IN THE PAST MONTH, HAVE YOU WISHED YOU WERE DEAD OR WISHED YOU COULD GO TO SLEEP AND NOT WAKE UP?: NO

## 2024-09-04 ASSESSMENT — ENCOUNTER SYMPTOMS
LOSS OF SENSATION IN FEET: 0
OCCASIONAL FEELINGS OF UNSTEADINESS: 0
DEPRESSION: 0

## 2024-09-04 ASSESSMENT — PATIENT HEALTH QUESTIONNAIRE - PHQ9
2. FEELING DOWN, DEPRESSED OR HOPELESS: NOT AT ALL
SUM OF ALL RESPONSES TO PHQ9 QUESTIONS 1 AND 2: 0
1. LITTLE INTEREST OR PLEASURE IN DOING THINGS: NOT AT ALL

## 2024-09-04 ASSESSMENT — PAIN SCALES - GENERAL: PAINLEVEL: 0-NO PAIN

## 2024-09-04 NOTE — PROGRESS NOTES
Subjective   Patient ID: Cherrie Garcia is a 79 y.o. female who presents for New Patient Visit (Ear, sinus infection).    HPI  INITIAL VISIT 12/9/2020:  Cherrie Garcia is a 75 year-old female here for ear (R > L)and sinonasal complaints that started a few days ago. Symptoms started first with bilateral ear pain, more on the right side of her neck. She also feels that her hearing feels slightly muffled, and she had some mild ringing today. She denies any ear drainage. Denies dizziness or vertigo, some infrequent lightheadedness. No previous ear surgeries or exposure to loud noise. Her parents both had hearing loss.   She had her right ear cleaned by Dr. Salas in October 2019.  She now is experiencing some nasal congestion that is accompanied by a mild runny nose (clear) and some facial pain above her eyes. She denies loss of smell or taste. No fevers. She has been using Afrin twice daily and Coricidin for the last 5 days. She does not use saline irrigations, can't tolerate. Has used Flonase in the past but has not done so in the last year. Has not been allergy tested. No previous sinonasal surgeries.     9/4/2024: Patient here today for right ear pain. This has been going on for > 3 months. She tried to clean the ear which helped some. It then started hurting again. In July she went to ED. ED said the ear was fine, went to dentist which was fine.   No trouble hearing. No ear drainage. No previous ear surgery.    The last few days she has a sore throat. She has clear nasal drainage.No nasal congestion. No fevers. Sometimes chills. No sinus rinsing. Has used Flonase a few days.     Patient Active Problem List   Diagnosis    Anxiety    Coronary artery arteriosclerosis    Costochondritis, acute    Dyslipidemia    GERD (gastroesophageal reflux disease)    HTN (hypertension)    Hypothyroidism    Vitamin D deficiency    Otitis media    Trochanteric bursitis of left hip    Type 2 diabetes mellitus (Multi)    Lumbar neuritis     Herniated lumbar intervertebral disc    Chronic low back pain    Arthritis    Chronic pain of left knee    Heel pain    Referred otalgia    Low back pain    Coronary artery disease involving native heart without angina pectoris    Gastroesophageal reflux disease    Essential hypertension    Hypertension    Acute sinusitis    Breast pain    Facial pain    Fatigue    Cough    Fibrocystic breast changes    History of macular degeneration    Nasal congestion    Nasal discharge     Past Surgical History:   Procedure Laterality Date    BREAST BIOPSY  2020    Biopsy Breast Percutaneous Needle Core     SECTION, CLASSIC  2014     Section    CORONARY ANGIOPLASTY WITH STENT PLACEMENT  06/15/2016    Cath Placement Of Stent 1    HYSTERECTOMY  2014    Hysterectomy    OTHER SURGICAL HISTORY  10/21/2019    Cataract surgery    OTHER SURGICAL HISTORY  10/21/2019    Knee replacement     Review of Systems    All other systems have been reviewed and are negative for complaints except for those mentioned in history of present illness, past medical history and problem list.     Objective   Physical Exam    Constitutional: No fever, chills, weight loss or weight gain  General appearance: Appears well, well-nourished, well groomed. No acute distress.    Communication: Normal communication    Psychiatric: Oriented to person, place and time. Normal mood and affect.    Neurologic: Cranial nerves II-XII grossly intact and symmetric bilaterally.    Head and Face:  Head: Atraumatic with no masses, lesions or scarring.  Face: Normal symmetry. No scars or deformities.  TMJ: Normal, no trismus.    Eyes: Conjunctiva not edematous or erythematous.     Right Ear: External inspection of ear with no deformity, scars, or masses. Ear canal is with non-occluding cerumen that was removed using the microscope,  and alligator forceps. After removal, TM is intact with no sign of infection, effusion, or retraction.      Left  Ear: External inspection of ear with no deformity, scars, or masses. EAC is clear.  TM is intact with no sign of infection, effusion, or retraction.  No perforation seen.     Nose: External inspection of nose: No nasal lesions, lacerations or scars. Anterior rhinoscopy with limited visualization past the inferior turbinates. No tenderness on frontal or maxillary sinus palpation.    Oral Cavity/Mouth: Oral cavity and oropharynx mucosa moist and pink. No lesions or masses. Dentition normal. Tonsils appear normal. Uvula is midline. Tongue with no masses or lesions. Tongue with good mobility. The oropharynx is clear.    Neck: Normal appearing, symmetric, trachea midline.     Cardiovascular: Examination of peripheral vascular system shows no clubbing or cyanosis.    Respiratory: No respiratory distress increased work of breathing. Inspection of the chest with symmetric chest expansion and normal respiratory effort.    Skin: No head and neck rashes.    Lymph nodes: No adenopathy.     Assessment/Plan   Diagnoses and all orders for this visit:  Excessive cerumen in right ear canal  Otalgia of both ears  Ear was successfully cleaned. Reassurance given I don't see any evidence of infection.    Right ear pain  Nasal drainage  Sore throat  Facial pressure  Her symptoms seem viral in nature. I don't see any evidence of a bacterial sinusitis today. Since symptoms just started, I recommend sinus rinses and Flonase as well as rest and fluids. If symptoms worsen, she should proceed to Urgent Care for further testing such as flu/covid/strep. Follow up next week. If having persistent sinonasal symptoms may consider an antibiotic.  I also recommend obtaining audiogram.    All questions answered to patient satisfaction.          JEREMY Luna 09/04/24 9:09 AM

## 2024-09-17 ENCOUNTER — APPOINTMENT (OUTPATIENT)
Dept: PHARMACY | Facility: HOSPITAL | Age: 79
End: 2024-09-17
Payer: MEDICARE

## 2024-09-17 DIAGNOSIS — E11.69 TYPE 2 DIABETES MELLITUS WITH OTHER SPECIFIED COMPLICATION, UNSPECIFIED WHETHER LONG TERM INSULIN USE (MULTI): Primary | ICD-10-CM

## 2024-09-17 PROCEDURE — RXMED WILLOW AMBULATORY MEDICATION CHARGE

## 2024-09-17 RX ORDER — DULAGLUTIDE 1.5 MG/.5ML
1.5 INJECTION, SOLUTION SUBCUTANEOUS WEEKLY
Qty: 2 ML | Refills: 11 | Status: SHIPPED | OUTPATIENT
Start: 2024-09-17

## 2024-09-17 NOTE — PROGRESS NOTES
Clinical Pharmacy Appointment    Patient ID: Cherrie Garcia is a 79 y.o. female who presents for No chief complaint on file..    Pt is here for First appointment.     Referring Provider/PCP: Praful Bernabe MD  Last visit with PCP: 8/12/24   Next visit with PCP: 1/14/25      Subjective     HPI  DIABETES MELLITUS TYPE II:    Diagnosed with diabetes: a little over 10 years per patients. Known diabetic complications: None.  Does patient follow with Endocrinology: No  Last optometry exam: 2024 (a few months ago)  Most recent visit in Podiatry: September 2024     Current diabetic medications include:  Trulicity 0.75 mg once weekly on Mondays  Glipizide XL 10 mg daily  Metformin 1000 mg twice daily    Clarifications to above regimen: None  Adverse Effects: None    Past diabetic medications include:  None    Glucose Readings:  Glucometer/CGM Type: glucometer  Patient tests BG 1-2 times per day    Current home BG readings: Fasting-155, 178  Previous home BG readings: N/A    Any episodes of hypoglycemia? Yes,   .  Did patient treat episode of hypoglycemia appropriately? Yes, orange juice  Does the patient have a prescription for ready-to-use Glucagon? Not on insulin  Does pt have proteinuria? No    Lifestyle:  Diet: unknown meals/day.  BK: unknown   LN: unknown   DN: unknown   Snacks: unknown   Drinks: unknown     Physical Activity: unknown     Secondary Prevention:  Statin? Yes  ACE-I/ARB? Yes  Aspirin? Yes    Pertinent PMH Review:  PMH of Pancreatitis: No  PMH of Retinopathy: No  PMH of Urinary Tract Infections: No  PMH of MTC: No    Medication Reconciliation:  Changed:   Added:   Discontinued:     Drug Interactions  No relevant drug interactions were noted.    Medication System Management  [facility]  Adherence/Organization: No issues  Affordability/Accessibility: Trulicity (patient to send income information)      Objective   Allergies   Allergen Reactions    Amoxicillin-Pot Clavulanate Unknown     Diclofenac Unknown    Diclofenac Potassium Unknown    Mucinex [Guaifenesin] Unknown    Penicillin Unknown    Penicillins Unknown    Pseudoephedrine-Guaifenesin Unknown     Social History     Social History Narrative    Not on file      Medication Review  Current Outpatient Medications   Medication Instructions    aspirin 81 mg EC tablet 1 tablet, oral, Daily    Calmoseptine 0.44-20.6 % ointment PLEASE SEE ATTACHED FOR DETAILED DIRECTIONS    celecoxib (CeleBREX) 100 mg capsule 1 capsule, oral, 2 times daily    cholecalciferol (VITAMIN D-3) 2,000 Units, oral, Daily    fluconazole (Diflucan) 150 mg tablet TAKE 1 TABLET (ORAL) AS DIRECTED TAKE ONE TABLET BY MOUTH TODAY AND REPEAT IN 3 DAYS.    gabapentin (Neurontin) 300 mg capsule oral    glipiZIDE XL (GLUCOTROL XL) 10 mg, oral, Daily    lancets misc Test once daily as directed    levothyroxine (SYNTHROID, LEVOXYL) 100 mcg, oral, Daily    metFORMIN (GLUCOPHAGE) 1,000 mg, oral, 2 times daily (morning and late afternoon)    OneTouch Verio test strips strip 1 strip, transdermal, 3 times daily    orphenadrine (Norflex) 100 mg 12 hr tablet oral    simvastatin (Zocor) 20 mg tablet 1 tablet, oral, Daily    Trulicity 0.75 mg, subcutaneous, Once Weekly    valsartan (DIOVAN) 80 mg, oral, Daily    vit A/vit C/vit E/zinc/copper (VITAMINS A,C,E-ZINC-COPPER ORAL) oral      Vitals  BP Readings from Last 2 Encounters:   09/04/24 118/72   08/12/24 101/67     BMI Readings from Last 1 Encounters:   09/04/24 33.84 kg/m²      Labs  A1C  Lab Results   Component Value Date    HGBA1C 7.6 (H) 08/12/2024    HGBA1C 8.8 (H) 04/29/2024    HGBA1C 7.3 (H) 12/05/2023     BMP  Lab Results   Component Value Date    CALCIUM 9.6 04/27/2024     (L) 04/27/2024    K 4.0 04/27/2024    CO2 25 04/27/2024    CL 98 04/27/2024    BUN 13 04/27/2024    CREATININE 0.70 04/27/2024    EGFR 88 04/27/2024     LFTs  Lab Results   Component Value Date    ALT 12 04/27/2024    AST 14 04/27/2024    ALKPHOS 35 04/27/2024     BILITOT 1.4 (H) 04/27/2024     FLP  Lab Results   Component Value Date    TRIG 187 (H) 12/05/2023    CHOL 156 12/05/2023    LDLF 35 06/16/2023    LDLCALC 57 12/05/2023    HDL 61.7 12/05/2023     Urine Microalbumin  Lab Results   Component Value Date    MICROALBCREA  08/12/2024      Comment:      One or more analytes used in this calculation is outside of the analytical measurement range. Calculation cannot be performed.     Weight Management  Wt Readings from Last 3 Encounters:   09/04/24 83.9 kg (185 lb)   08/12/24 84.1 kg (185 lb 6.4 oz)   07/12/24 83 kg (183 lb)      There is no height or weight on file to calculate BMI.     Assessment/Plan   Problem List Items Addressed This Visit    None     Patient Assistance Screening (VAF)  Patient verbally reports monthly or yearly income which is less than 400% federal poverty level  Application for program has been submitted for the following medications:   [facility]  Patient aware this process may take up to 2 weeks once income documents have been sent to the team.  If approved, medication must be filled through [facility] pharmacy and may be picked up or mailed to patient.   If approved, medication will be billed through insurance, and patient assistance team will pay the copay. This will result in a $0 copay for the patient.  Counseled patient on mechanism of action, side effects, contraindications, and what to do if the patient misses a dose. All patients questions were answered.     Clinical Pharmacist follow-up: 10/16, Telehealth visit    Continue all meds under the continuation of care with the referring provider and clinical pharmacy team.    Thank you,  Peyton Kelley, PharmD, [facility]  Clinical Pharmacy Specialist  [phone]    Verbal consent to manage patient's drug therapy was obtained from the patient. They were informed they may decline to participate or withdraw from participation in pharmacy services at any time.

## 2024-09-17 NOTE — Clinical Note
Plan to increase trulicity to 1.5 and apply patient for  copay assistance for if we decide to switch to more potent GLP1 or add SGLT2

## 2024-09-17 NOTE — ASSESSMENT & PLAN NOTE
Patient's goal A1c is < 7%.  Is pt at goal? No, 7.6  Patient's SMBGs are above goal.     Rationale for plan: A1c above goal. Patient on lowest dose of Trulicity with no side effects, patient desires decreasing amount of medications as much as possible.    Medication Changes:  CONTINUE:  Glipizide XL 10 mg daily  Metformin 1000 mg twice daily  INCREASE  Trulicity 0.75 to 1.5 mg once weekly

## 2024-09-19 ENCOUNTER — PHARMACY VISIT (OUTPATIENT)
Dept: PHARMACY | Facility: CLINIC | Age: 79
End: 2024-09-19
Payer: MEDICARE

## 2024-10-16 ENCOUNTER — APPOINTMENT (OUTPATIENT)
Dept: PHARMACY | Facility: HOSPITAL | Age: 79
End: 2024-10-16
Payer: MEDICARE

## 2024-10-16 DIAGNOSIS — E11.69 TYPE 2 DIABETES MELLITUS WITH OTHER SPECIFIED COMPLICATION, UNSPECIFIED WHETHER LONG TERM INSULIN USE (MULTI): ICD-10-CM

## 2024-10-16 NOTE — PROGRESS NOTES
Clinical Pharmacy Appointment    Patient ID: Cherrie Garcia is a 79 y.o. female who presents for No chief complaint on file..    Pt is here for Follow Up appointment.     Referring Provider/PCP: Praful Bernabe MD  Last visit with PCP: 8/12/24   Next visit with PCP: 1/14/25      Subjective     HPI  DIABETES MELLITUS TYPE II:    Diagnosed with diabetes: a little over 10 years per patients. Known diabetic complications: None.  Does patient follow with Endocrinology: No  Last optometry exam: 2024 (a few months ago)  Most recent visit in Podiatry: September 2024     Current diabetic medications include:  Trulicity 1.5 mg once weekly on Mondays x 1  Glipizide XL 10 mg daily  Metformin 1000 mg twice daily    Clarifications to above regimen: None  Adverse Effects: None    Past diabetic medications include:  None    Glucose Readings:  Glucometer/CGM Type: glucometer  Patient tests BG 1-2 times per day    Current home BG readings: Fasting 144, 157  Previous home BG readings: Fasting-155, 178    Any episodes of hypoglycemia? Yes,   .  Did patient treat episode of hypoglycemia appropriately? Yes, orange juice  Does the patient have a prescription for ready-to-use Glucagon? Not on insulin  Does pt have proteinuria? No    Lifestyle:   Diet: unknown meals/day.  BK: unknown   LN: unknown   DN: unknown   Snacks: unknown   Drinks: unknown     Physical Activity: unknown     Secondary Prevention:  Statin? Yes  ACE-I/ARB? Yes  Aspirin? Yes    Pertinent PMH Review:  PMH of Pancreatitis: No  PMH of Retinopathy: No  PMH of Urinary Tract Infections: No  PMH of MTC: No    Medication Reconciliation:  Changed:   Added:   Discontinued:     Drug Interactions  No relevant drug interactions were noted.    Medication System Management  [facility]  Adherence/Organization: No issues  Affordability/Accessibility: Trulicity through [facility] through 9/17/25      Objective   Allergies   Allergen Reactions    Amoxicillin-Pot  Clavulanate Unknown    Diclofenac Unknown    Diclofenac Potassium Unknown    Mucinex [Guaifenesin] Unknown    Penicillin Unknown    Penicillins Unknown    Pseudoephedrine-Guaifenesin Unknown     Social History     Social History Narrative    Not on file      Medication Review  Current Outpatient Medications   Medication Instructions    aspirin 81 mg EC tablet 1 tablet, oral, Daily    Calmoseptine 0.44-20.6 % ointment PLEASE SEE ATTACHED FOR DETAILED DIRECTIONS    celecoxib (CeleBREX) 100 mg capsule 1 capsule, oral, 2 times daily    cholecalciferol (VITAMIN D-3) 2,000 Units, oral, Daily    fluconazole (Diflucan) 150 mg tablet TAKE 1 TABLET (ORAL) AS DIRECTED TAKE ONE TABLET BY MOUTH TODAY AND REPEAT IN 3 DAYS.    gabapentin (Neurontin) 300 mg capsule oral    glipiZIDE XL (GLUCOTROL XL) 10 mg, oral, Daily    lancets misc Test once daily as directed    levothyroxine (SYNTHROID, LEVOXYL) 100 mcg, oral, Daily    metFORMIN (GLUCOPHAGE) 1,000 mg, oral, 2 times daily (morning and late afternoon)    OneTouch Verio test strips strip 1 strip, transdermal, 3 times daily    orphenadrine (Norflex) 100 mg 12 hr tablet oral    simvastatin (Zocor) 20 mg tablet 1 tablet, oral, Daily    Trulicity 1.5 mg, subcutaneous, Weekly    valsartan (DIOVAN) 80 mg, oral, Daily    vit A/vit C/vit E/zinc/copper (VITAMINS A,C,E-ZINC-COPPER ORAL) oral      Vitals  BP Readings from Last 2 Encounters:   09/04/24 118/72   08/12/24 101/67     BMI Readings from Last 1 Encounters:   09/04/24 33.84 kg/m²      Labs  A1C  Lab Results   Component Value Date    HGBA1C 7.6 (H) 08/12/2024    HGBA1C 8.8 (H) 04/29/2024    HGBA1C 7.3 (H) 12/05/2023     BMP  Lab Results   Component Value Date    CALCIUM 9.6 04/27/2024     (L) 04/27/2024    K 4.0 04/27/2024    CO2 25 04/27/2024    CL 98 04/27/2024    BUN 13 04/27/2024    CREATININE 0.70 04/27/2024    EGFR 88 04/27/2024     LFTs  Lab Results   Component Value Date    ALT 12 04/27/2024    AST 14 04/27/2024     ALKPHOS 35 04/27/2024    BILITOT 1.4 (H) 04/27/2024     FLP  Lab Results   Component Value Date    TRIG 187 (H) 12/05/2023    CHOL 156 12/05/2023    LDLF 35 06/16/2023    LDLCALC 57 12/05/2023    HDL 61.7 12/05/2023     Urine Microalbumin  Lab Results   Component Value Date    MICROALBCREA  08/12/2024      Comment:      One or more analytes used in this calculation is outside of the analytical measurement range. Calculation cannot be performed.     Weight Management  Wt Readings from Last 3 Encounters:   09/04/24 83.9 kg (185 lb)   08/12/24 84.1 kg (185 lb 6.4 oz)   07/12/24 83 kg (183 lb)      There is no height or weight on file to calculate BMI.     Assessment/Plan   Problem List Items Addressed This Visit       Type 2 diabetes mellitus     Patient's goal A1c is < 7%.  Is pt at goal? No, 7.6  Patient's SMBGs are above goal.     Rationale for plan: A1c above goal. Patient on just started Trulicity 3 mg 2 days ago. No ill effects thus far. Patient desires 3 month trial on this dose before increasing. Would like to come off as many other pills as possible.    Medication Changes:  CONTINUE:  Glipizide XL 10 mg daily  Metformin 1000 mg twice daily  Trulicity 1.5 mg once weekly    Future considerations  Stopping glipizide when increasing trulicity to 3 mg         Relevant Orders    Referral to Clinical Pharmacy       Clinical Pharmacist follow-up: 12/17 at 10 am, Telehealth visit    Continue all meds under the continuation of care with the referring provider and clinical pharmacy team.    Thank you,  Peyton Kelley, PharmD, [facility]  Clinical Pharmacy Specialist  [phone]    Verbal consent to manage patient's drug therapy was obtained from the patient. They were informed they may decline to participate or withdraw from participation in pharmacy services at any time.

## 2024-10-16 NOTE — Clinical Note
Patient doing well on Trulicity 1.5 mg. Per patient wishes will follow up in 2.5 months before thinking about increasing to 3 mg.

## 2024-10-16 NOTE — ASSESSMENT & PLAN NOTE
Patient's goal A1c is < 7%.  Is pt at goal? No, 7.6  Patient's SMBGs are above goal.     Rationale for plan: A1c above goal. Patient on just started Trulicity 3 mg 2 days ago. No ill effects thus far. Patient desires 3 month trial on this dose before increasing. Would like to come off as many other pills as possible.    Medication Changes:  CONTINUE:  Glipizide XL 10 mg daily  Metformin 1000 mg twice daily  Trulicity 1.5 mg once weekly    Future considerations  Stopping glipizide when increasing trulicity to 3 mg

## 2024-10-25 ENCOUNTER — OFFICE VISIT (OUTPATIENT)
Dept: PRIMARY CARE | Facility: CLINIC | Age: 79
End: 2024-10-25
Payer: MEDICARE

## 2024-10-25 VITALS
RESPIRATION RATE: 14 BRPM | BODY MASS INDEX: 33.43 KG/M2 | WEIGHT: 182.8 LBS | HEART RATE: 82 BPM | OXYGEN SATURATION: 98 % | DIASTOLIC BLOOD PRESSURE: 66 MMHG | SYSTOLIC BLOOD PRESSURE: 107 MMHG

## 2024-10-25 DIAGNOSIS — F41.9 ANXIETY: ICD-10-CM

## 2024-10-25 DIAGNOSIS — K21.9 GASTROESOPHAGEAL REFLUX DISEASE WITHOUT ESOPHAGITIS: Primary | ICD-10-CM

## 2024-10-25 PROCEDURE — 99213 OFFICE O/P EST LOW 20 MIN: CPT | Performed by: INTERNAL MEDICINE

## 2024-10-25 PROCEDURE — 1159F MED LIST DOCD IN RCRD: CPT | Performed by: INTERNAL MEDICINE

## 2024-10-25 PROCEDURE — 3078F DIAST BP <80 MM HG: CPT | Performed by: INTERNAL MEDICINE

## 2024-10-25 PROCEDURE — 3074F SYST BP LT 130 MM HG: CPT | Performed by: INTERNAL MEDICINE

## 2024-10-25 RX ORDER — OMEPRAZOLE 40 MG/1
40 CAPSULE, DELAYED RELEASE ORAL DAILY
Qty: 30 CAPSULE | Refills: 1 | Status: SHIPPED | OUTPATIENT
Start: 2024-10-25

## 2024-10-25 ASSESSMENT — ENCOUNTER SYMPTOMS
ABDOMINAL PAIN: 1
CONSTIPATION: 0
BLOOD IN STOOL: 0
DIARRHEA: 0

## 2024-10-25 NOTE — PROGRESS NOTES
Subjective   Patient ID: Cherrie Garcia is a 79 y.o. female who presents for Abdominal Pain (Dull achey pain, discomfort. Suspects ulcers).    Started to have epigastric pain 3 months ago. Pain is a dull ache that is constant, non-radiating, and progressively worsened. Denies diarrhea, constipation, or blood in stool. Took tums and zantac which didn't provide any relief. Went to the pharmacy this week and got prilosec and after 3 pills is already noticing a difference. Has had a lot of stress with her . Has only noticed symptoms worsen when she lays down at night. Denies pain worsening with exertion.        Review of Systems   Gastrointestinal:  Positive for abdominal pain. Negative for blood in stool, constipation and diarrhea.       /66 (BP Location: Right arm, Patient Position: Sitting)   Pulse 82   Resp 14   Wt 82.9 kg (182 lb 12.8 oz)   SpO2 98%   BMI 33.43 kg/m²   Objective   Physical Exam  Constitutional:       General: She is not in acute distress.     Appearance: She is not ill-appearing, toxic-appearing or diaphoretic.   HENT:      Head: Normocephalic and atraumatic.   Eyes:      Conjunctiva/sclera: Conjunctivae normal.   Cardiovascular:      Rate and Rhythm: Normal rate and regular rhythm.      Heart sounds: No murmur heard.     No friction rub. No gallop.   Abdominal:      General: Abdomen is flat. Bowel sounds are normal. There is no distension.      Palpations: Abdomen is soft.      Tenderness: There is abdominal tenderness (epigastric). There is no guarding.   Neurological:      Mental Status: She is alert.         Assessment/Plan   Problem List Items Addressed This Visit             ICD-10-CM    Anxiety F41.9    GERD (gastroesophageal reflux disease) - Primary K21.9    Relevant Medications    omeprazole (PriLOSEC) 40 mg DR capsule   -Hx concerning for GERD. Less concerned for pancreatitis or cardiac etiology.  -Reviewed how to take PPI appropriately. Will do this for 1 month. If  symptoms not entirely gone then will refill. Will follow up in January.  -Pt notes she has been more anxious caring for her . She feels this is what caused her stomach discomfort. Discussed ways to manage this including medication and therapy. Pt states she only gets very stressed at certain times, however we reviewed that using a quick acting medication may make her too drowsy (a hydroxyzine, buspar, or a benzo). Pt says her son recommended [employer]; she will look and see if this would be a good option for her. Encouraged her to let us know if there is anything more we can do to help.         Praful Bernabe MD 10/25/24 1:47 PM

## 2024-10-31 ENCOUNTER — PHARMACY VISIT (OUTPATIENT)
Dept: PHARMACY | Facility: CLINIC | Age: 79
End: 2024-10-31
Payer: MEDICARE

## 2024-10-31 PROCEDURE — RXMED WILLOW AMBULATORY MEDICATION CHARGE

## 2024-11-16 DIAGNOSIS — K21.9 GASTROESOPHAGEAL REFLUX DISEASE WITHOUT ESOPHAGITIS: ICD-10-CM

## 2024-11-18 RX ORDER — OMEPRAZOLE 40 MG/1
40 CAPSULE, DELAYED RELEASE ORAL DAILY
Qty: 90 CAPSULE | Refills: 1 | Status: SHIPPED | OUTPATIENT
Start: 2024-11-18

## 2024-12-10 PROCEDURE — RXMED WILLOW AMBULATORY MEDICATION CHARGE

## 2024-12-11 ENCOUNTER — PHARMACY VISIT (OUTPATIENT)
Dept: PHARMACY | Facility: CLINIC | Age: 79
End: 2024-12-11
Payer: MEDICARE

## 2024-12-17 ENCOUNTER — APPOINTMENT (OUTPATIENT)
Dept: PHARMACY | Facility: HOSPITAL | Age: 79
End: 2024-12-17
Payer: MEDICARE

## 2024-12-17 DIAGNOSIS — E11.69 TYPE 2 DIABETES MELLITUS WITH OTHER SPECIFIED COMPLICATION, UNSPECIFIED WHETHER LONG TERM INSULIN USE (MULTI): ICD-10-CM

## 2024-12-17 NOTE — PROGRESS NOTES
Clinical Pharmacy Appointment    Patient ID: Cherrie Garcia is a 79 y.o. female who presents for Diabetes.    Pt is here for Follow Up appointment.     Referring Provider/PCP: rPaful Bernabe MD  Last visit with PCP: 10/25/24   Next visit with PCP: 1/14/25      Subjective     HPI  DIABETES MELLITUS TYPE II:    Diagnosed with diabetes: a little over 10 years per patients. Known diabetic complications: None.  Does patient follow with Endocrinology: No  Last optometry exam: 2024 (a few months ago)  Most recent visit in Podiatry: September 2024     Current diabetic medications include:  Trulicity 1.5 mg once weekly on Mondays  Glipizide XL 10 mg daily  Metformin 1000 mg twice daily    Clarifications to above regimen: None  Adverse Effects: None    Past diabetic medications include:  None    Glucose Readings:  Glucometer/CGM Type: glucometer  Patient tests BG 1-2 times per day    Current home BG readings: Fasting 139-148, Average 150  Previous home BG readings:   Fasting- 144, 157  Fasting- 155, 178    Any episodes of hypoglycemia? Yes,   .  Did patient treat episode of hypoglycemia appropriately? Yes, orange juice  Does the patient have a prescription for ready-to-use Glucagon? Not on insulin  Does pt have proteinuria? No    Lifestyle:   Diet: unknown meals/day.  BK: unknown   LN: unknown   DN: unknown   Snacks: unknown   Drinks: unknown     Physical Activity: unknown     Secondary Prevention:  Statin? Yes  ACE-I/ARB? Yes  Aspirin? Yes    Pertinent PMH Review:  PMH of Pancreatitis: No  PMH of Retinopathy: No  PMH of Urinary Tract Infections: No  PMH of MTC: No    Medication Reconciliation:  Changed:   Added:   Discontinued:     Drug Interactions  No relevant drug interactions were noted.    Medication System Management  [facility]  Adherence/Organization: No issues  Affordability/Accessibility: Trulicity through [facility] through 9/17/25      Objective   Allergies   Allergen Reactions     Amoxicillin-Pot Clavulanate Unknown    Diclofenac Unknown    Diclofenac Potassium Unknown    Mucinex [Guaifenesin] Unknown    Penicillin Unknown    Penicillins Unknown    Pseudoephedrine-Guaifenesin Unknown     Social History     Social History Narrative    Not on file      Medication Review  Current Outpatient Medications   Medication Instructions    aspirin 81 mg EC tablet 1 tablet, oral, Daily    Calmoseptine 0.44-20.6 % ointment PLEASE SEE ATTACHED FOR DETAILED DIRECTIONS    celecoxib (CeleBREX) 100 mg capsule 1 capsule, oral, 2 times daily    cholecalciferol (VITAMIN D-3) 2,000 Units, oral, Daily    fluconazole (Diflucan) 150 mg tablet TAKE 1 TABLET (ORAL) AS DIRECTED TAKE ONE TABLET BY MOUTH TODAY AND REPEAT IN 3 DAYS.    gabapentin (Neurontin) 300 mg capsule oral    lancets misc Test once daily as directed    levothyroxine (SYNTHROID, LEVOXYL) 100 mcg, oral, Daily    metFORMIN (GLUCOPHAGE) 1,000 mg, oral, 2 times daily (morning and late afternoon)    omeprazole (PRILOSEC) 40 mg, oral, Daily, Do not crush or chew.    OneTouch Verio test strips strip 1 strip, transdermal, 3 times daily    orphenadrine (Norflex) 100 mg 12 hr tablet oral    simvastatin (Zocor) 20 mg tablet 1 tablet, oral, Daily    Trulicity 1.5 mg, subcutaneous, Weekly    valsartan (DIOVAN) 80 mg, oral, Daily    vit A/vit C/vit E/zinc/copper (VITAMINS A,C,E-ZINC-COPPER ORAL) oral      Vitals  BP Readings from Last 2 Encounters:   10/25/24 107/66   09/04/24 118/72     BMI Readings from Last 1 Encounters:   10/25/24 33.43 kg/m²      Labs  A1C  Lab Results   Component Value Date    HGBA1C 7.6 (H) 08/12/2024    HGBA1C 8.8 (H) 04/29/2024    HGBA1C 7.3 (H) 12/05/2023     BMP  Lab Results   Component Value Date    CALCIUM 9.6 04/27/2024     (L) 04/27/2024    K 4.0 04/27/2024    CO2 25 04/27/2024    CL 98 04/27/2024    BUN 13 04/27/2024    CREATININE 0.70 04/27/2024    EGFR 88 04/27/2024     LFTs  Lab Results   Component Value Date    ALT 12  04/27/2024    AST 14 04/27/2024    ALKPHOS 35 04/27/2024    BILITOT 1.4 (H) 04/27/2024     FLP  Lab Results   Component Value Date    TRIG 187 (H) 12/05/2023    CHOL 156 12/05/2023    LDLF 35 06/16/2023    LDLCALC 57 12/05/2023    HDL 61.7 12/05/2023     Urine Microalbumin  Lab Results   Component Value Date    MICROALBCREA  08/12/2024      Comment:      One or more analytes used in this calculation is outside of the analytical measurement range. Calculation cannot be performed.     Weight Management  Wt Readings from Last 3 Encounters:   10/25/24 82.9 kg (182 lb 12.8 oz)   09/04/24 83.9 kg (185 lb)   08/12/24 84.1 kg (185 lb 6.4 oz)      There is no height or weight on file to calculate BMI.     Assessment/Plan   Problem List Items Addressed This Visit       Type 2 diabetes mellitus     Patient's goal A1c is < 7%.  Is pt at goal? No, 7.6  Patient's SMBGs are above goal (140-150s).   Patient ran out of glipizide last week, has been off of and sugars are stable, stay off of, finish this box of trulicity 1.5 and increase at next visit.  Rationale for plan: A1c above goal.    Medication Changes:  CONTINUE:  Metformin 1000 mg twice daily  Trulicity 1.5 mg once weekly    STOP  Glipizide XL 10 mg daily         Relevant Orders    Referral to Clinical Pharmacy     Clinical Pharmacist follow-up: 12/31 at 1020, Telehealth visit    Continue all meds under the continuation of care with the referring provider and clinical pharmacy team.    Thank you,  Peyton Kelley, PharmD, [facility]  Clinical Pharmacy Specialist  [phone]    Verbal consent to manage patient's drug therapy was obtained from the patient. They were informed they may decline to participate or withdraw from participation in pharmacy services at any time.

## 2024-12-17 NOTE — ASSESSMENT & PLAN NOTE
Patient's goal A1c is < 7%.  Is pt at goal? No, 7.6  Patient's SMBGs are above goal (140-150s).   Patient ran out of glipizide last week, has been off of and sugars are stable, stay off of, finish this box of trulicity 1.5 and increase at next visit.  Rationale for plan: A1c above goal.    Medication Changes:  CONTINUE:  Metformin 1000 mg twice daily  Trulicity 1.5 mg once weekly    STOP  Glipizide XL 10 mg daily

## 2024-12-17 NOTE — Clinical Note
Patient with fasting sugars still 140's to 150s. Will increase Trulicity to 3 mg once she finishes the box she just picked up.

## 2024-12-31 ENCOUNTER — APPOINTMENT (OUTPATIENT)
Dept: PHARMACY | Facility: HOSPITAL | Age: 79
End: 2024-12-31
Payer: MEDICARE

## 2024-12-31 DIAGNOSIS — E11.69 TYPE 2 DIABETES MELLITUS WITH OTHER SPECIFIED COMPLICATION, UNSPECIFIED WHETHER LONG TERM INSULIN USE (MULTI): ICD-10-CM

## 2024-12-31 PROCEDURE — RXMED WILLOW AMBULATORY MEDICATION CHARGE

## 2024-12-31 RX ORDER — DULAGLUTIDE 3 MG/.5ML
3 INJECTION, SOLUTION SUBCUTANEOUS WEEKLY
Qty: 2 ML | Refills: 11 | Status: SHIPPED | OUTPATIENT
Start: 2024-12-31

## 2024-12-31 NOTE — ASSESSMENT & PLAN NOTE
Patient's goal A1c is < 7%.  Is pt at goal? No, 7.6  Patient's SMBGs are above goal (140-150s).   Sugars stable since stopping glipizide. Willing to increase Trulicity at this time.  Rationale for plan: A1c above goal. Increase Trulicity.    Medication Changes:  CONTINUE:  Metformin 1000 mg twice daily  INCREASE  Trulicity 1.5 to 3 mg once weekly

## 2024-12-31 NOTE — Clinical Note
Patient's sugars stable off glipizide, will discontinue at this time. Fasting's around 140, will increase trulicity from 1.5 to 3 mg now.

## 2024-12-31 NOTE — PROGRESS NOTES
Clinical Pharmacy Appointment    Patient ID: Cherrie Garcia is a 79 y.o. female who presents for Diabetes.    Pt is here for Follow Up appointment.     Referring Provider/PCP: Praful Bernabe MD  Last visit with PCP: 10/25/24   Next visit with PCP: 1/14/25      Subjective     HPI  DIABETES MELLITUS TYPE II:    Diagnosed with diabetes: a little over 10 years per patients. Known diabetic complications: None.  Does patient follow with Endocrinology: No  Last optometry exam: 2024 (a few months ago)  Most recent visit in Podiatry: September 2024     Current diabetic medications include:  Trulicity 1.5 mg once weekly on Mondays  Metformin 1000 mg twice daily    Clarifications to above regimen: None  Adverse Effects: None    Past diabetic medications include:  None    Glucose Readings:  Glucometer/CGM Type: glucometer  Patient tests BG 1-2 times per day    Current home BG readings: Fasting 141  Previous home BG readings:   Fasting 139-148, Average 150  Fasting- 144, 157  Fasting- 155, 178    Any episodes of hypoglycemia? Yes,   .  Did patient treat episode of hypoglycemia appropriately? Yes, orange juice  Does the patient have a prescription for ready-to-use Glucagon? Not on insulin  Does pt have proteinuria? No    Lifestyle:   Diet: unknown meals/day.  BK: unknown   LN: unknown   DN: unknown   Snacks: unknown   Drinks: unknown     Physical Activity: unknown     Secondary Prevention:  Statin? Yes  ACE-I/ARB? Yes  Aspirin? Yes    Pertinent PMH Review:  PMH of Pancreatitis: No  PMH of Retinopathy: No  PMH of Urinary Tract Infections: No  PMH of MTC: No    Medication Reconciliation:  Changed:   Added:   Discontinued:     Drug Interactions  No relevant drug interactions were noted.    Medication System Management  [facility]  Adherence/Organization: No issues  Affordability/Accessibility: Trulicity through [facility] through 9/17/25      Objective   Allergies   Allergen Reactions    Amoxicillin-Pot  Clavulanate Unknown    Diclofenac Unknown    Diclofenac Potassium Unknown    Mucinex [Guaifenesin] Unknown    Penicillin Unknown    Penicillins Unknown    Pseudoephedrine-Guaifenesin Unknown     Social History     Social History Narrative    Not on file      Medication Review  Current Outpatient Medications   Medication Instructions    aspirin 81 mg EC tablet 1 tablet, oral, Daily    Calmoseptine 0.44-20.6 % ointment PLEASE SEE ATTACHED FOR DETAILED DIRECTIONS    celecoxib (CeleBREX) 100 mg capsule 1 capsule, oral, 2 times daily    cholecalciferol (VITAMIN D-3) 2,000 Units, oral, Daily    fluconazole (Diflucan) 150 mg tablet TAKE 1 TABLET (ORAL) AS DIRECTED TAKE ONE TABLET BY MOUTH TODAY AND REPEAT IN 3 DAYS.    gabapentin (Neurontin) 300 mg capsule oral    lancets misc Test once daily as directed    levothyroxine (SYNTHROID, LEVOXYL) 100 mcg, oral, Daily    metFORMIN (GLUCOPHAGE) 1,000 mg, oral, 2 times daily (morning and late afternoon)    omeprazole (PRILOSEC) 40 mg, oral, Daily, Do not crush or chew.    OneTouch Verio test strips strip 1 strip, transdermal, 3 times daily    orphenadrine (Norflex) 100 mg 12 hr tablet oral    simvastatin (Zocor) 20 mg tablet 1 tablet, oral, Daily    Trulicity 3 mg, subcutaneous, Weekly    valsartan (DIOVAN) 80 mg, oral, Daily    vit A/vit C/vit E/zinc/copper (VITAMINS A,C,E-ZINC-COPPER ORAL) oral      Vitals  BP Readings from Last 2 Encounters:   10/25/24 107/66   09/04/24 118/72     BMI Readings from Last 1 Encounters:   10/25/24 33.43 kg/m²      Labs  A1C  Lab Results   Component Value Date    HGBA1C 7.6 (H) 08/12/2024    HGBA1C 8.8 (H) 04/29/2024    HGBA1C 7.3 (H) 12/05/2023     BMP  Lab Results   Component Value Date    CALCIUM 9.6 04/27/2024     (L) 04/27/2024    K 4.0 04/27/2024    CO2 25 04/27/2024    CL 98 04/27/2024    BUN 13 04/27/2024    CREATININE 0.70 04/27/2024    EGFR 88 04/27/2024     LFTs  Lab Results   Component Value Date    ALT 12 04/27/2024    AST 14  04/27/2024    ALKPHOS 35 04/27/2024    BILITOT 1.4 (H) 04/27/2024     FLP  Lab Results   Component Value Date    TRIG 187 (H) 12/05/2023    CHOL 156 12/05/2023    LDLF 35 06/16/2023    LDLCALC 57 12/05/2023    HDL 61.7 12/05/2023     Urine Microalbumin  Lab Results   Component Value Date    MICROALBCREA  08/12/2024      Comment:      One or more analytes used in this calculation is outside of the analytical measurement range. Calculation cannot be performed.     Weight Management  Wt Readings from Last 3 Encounters:   10/25/24 82.9 kg (182 lb 12.8 oz)   09/04/24 83.9 kg (185 lb)   08/12/24 84.1 kg (185 lb 6.4 oz)      There is no height or weight on file to calculate BMI.     Assessment/Plan   Problem List Items Addressed This Visit       Type 2 diabetes mellitus     Patient's goal A1c is < 7%.  Is pt at goal? No, 7.6  Patient's SMBGs are above goal (140-150s).   Sugars stable since stopping glipizide. Willing to increase Trulicity at this time.  Rationale for plan: A1c above goal. Increase Trulicity.    Medication Changes:  CONTINUE:  Metformin 1000 mg twice daily  INCREASE  Trulicity 1.5 to 3 mg once weekly         Relevant Medications    dulaglutide (Trulicity) 3 mg/0.5 mL injection    Other Relevant Orders    Referral to Clinical Pharmacy       Clinical Pharmacist follow-up: 1/28 at 10 am, Telehealth visit    Continue all meds under the continuation of care with the referring provider and clinical pharmacy team.    Thank you,  Peyton Kelley, PharmD, [facility]  Clinical Pharmacy Specialist  [phone]    Verbal consent to manage patient's drug therapy was obtained from the patient. They were informed they may decline to participate or withdraw from participation in pharmacy services at any time.

## 2025-01-02 ENCOUNTER — PHARMACY VISIT (OUTPATIENT)
Dept: PHARMACY | Facility: CLINIC | Age: 80
End: 2025-01-02
Payer: MEDICARE

## 2025-01-13 ENCOUNTER — APPOINTMENT (OUTPATIENT)
Dept: GASTROENTEROLOGY | Facility: CLINIC | Age: 80
End: 2025-01-13
Payer: MEDICARE

## 2025-01-14 ENCOUNTER — APPOINTMENT (OUTPATIENT)
Dept: PRIMARY CARE | Facility: CLINIC | Age: 80
End: 2025-01-14
Payer: MEDICARE

## 2025-01-14 VITALS
HEART RATE: 91 BPM | RESPIRATION RATE: 10 BRPM | BODY MASS INDEX: 32.81 KG/M2 | SYSTOLIC BLOOD PRESSURE: 99 MMHG | OXYGEN SATURATION: 98 % | WEIGHT: 178.3 LBS | DIASTOLIC BLOOD PRESSURE: 64 MMHG | HEIGHT: 62 IN

## 2025-01-14 DIAGNOSIS — E11.9 TYPE 2 DIABETES MELLITUS WITHOUT COMPLICATION, WITHOUT LONG-TERM CURRENT USE OF INSULIN (MULTI): ICD-10-CM

## 2025-01-14 DIAGNOSIS — K21.9 GASTROESOPHAGEAL REFLUX DISEASE WITHOUT ESOPHAGITIS: ICD-10-CM

## 2025-01-14 DIAGNOSIS — E06.3 HYPOTHYROIDISM DUE TO HASHIMOTO'S THYROIDITIS: ICD-10-CM

## 2025-01-14 DIAGNOSIS — Z71.89 GOALS OF CARE, COUNSELING/DISCUSSION: ICD-10-CM

## 2025-01-14 DIAGNOSIS — Z00.00 MEDICARE ANNUAL WELLNESS VISIT, SUBSEQUENT: Primary | ICD-10-CM

## 2025-01-14 PROCEDURE — 1170F FXNL STATUS ASSESSED: CPT | Performed by: INTERNAL MEDICINE

## 2025-01-14 PROCEDURE — 1036F TOBACCO NON-USER: CPT | Performed by: INTERNAL MEDICINE

## 2025-01-14 PROCEDURE — 1158F ADVNC CARE PLAN TLK DOCD: CPT | Performed by: INTERNAL MEDICINE

## 2025-01-14 PROCEDURE — 3078F DIAST BP <80 MM HG: CPT | Performed by: INTERNAL MEDICINE

## 2025-01-14 PROCEDURE — 1159F MED LIST DOCD IN RCRD: CPT | Performed by: INTERNAL MEDICINE

## 2025-01-14 PROCEDURE — 1123F ACP DISCUSS/DSCN MKR DOCD: CPT | Performed by: INTERNAL MEDICINE

## 2025-01-14 PROCEDURE — 3074F SYST BP LT 130 MM HG: CPT | Performed by: INTERNAL MEDICINE

## 2025-01-14 PROCEDURE — G0439 PPPS, SUBSEQ VISIT: HCPCS | Performed by: INTERNAL MEDICINE

## 2025-01-14 RX ORDER — MOMETASONE FUROATE 1 MG/G
1 CREAM TOPICAL DAILY
COMMUNITY
Start: 2024-10-23

## 2025-01-14 RX ORDER — LEVOTHYROXINE SODIUM 100 UG/1
100 TABLET ORAL DAILY
Qty: 90 TABLET | Refills: 3 | Status: SHIPPED | OUTPATIENT
Start: 2025-01-14

## 2025-01-14 ASSESSMENT — ACTIVITIES OF DAILY LIVING (ADL)
BATHING: INDEPENDENT
TAKING_MEDICATION: INDEPENDENT
GROCERY_SHOPPING: INDEPENDENT
MANAGING_FINANCES: INDEPENDENT
DOING_HOUSEWORK: INDEPENDENT
DRESSING: INDEPENDENT

## 2025-01-14 ASSESSMENT — PATIENT HEALTH QUESTIONNAIRE - PHQ9
SUM OF ALL RESPONSES TO PHQ9 QUESTIONS 1 AND 2: 0
2. FEELING DOWN, DEPRESSED OR HOPELESS: NOT AT ALL
1. LITTLE INTEREST OR PLEASURE IN DOING THINGS: NOT AT ALL

## 2025-01-14 ASSESSMENT — ENCOUNTER SYMPTOMS
DEPRESSION: 0
LOSS OF SENSATION IN FEET: 0
OCCASIONAL FEELINGS OF UNSTEADINESS: 0
COUGH: 0

## 2025-01-14 NOTE — ASSESSMENT & PLAN NOTE
-Symptoms improved after 1 month of omeprazole. They returned while pt was sick. She wants to see how symptoms change as she starts to eat again.

## 2025-01-14 NOTE — PROGRESS NOTES
"Subjective   Reason for Visit: Cherrie Garcia is an 79 y.o. female here for a Medicare Wellness visit.          Reviewed all medications by prescribing practitioner or clinical pharmacist (such as prescriptions, OTCs, herbal therapies and supplements) and documented in the medical record.    Pt here for MAW.    Was sick the last 2 weeks but got better on her own.    Pt took omeprazole for her heartburn for a month which initially resolved symptoms. Since then symptoms have gradually worsened while she has been sick.      Patient Care Team:  Praful Bernabe MD as PCP - General (Internal Medicine)  Tin San DO [facility]  Neil LindsayD as Pharmacist (Pharmacy)     Pt is not considered to be at high risk of opioid abuse after reviewing chart.    Review of Systems   Respiratory:  Negative for cough.    Gastrointestinal:         Heartburn negative       Objective   Vitals:  BP 99/64 (BP Location: Right arm, Patient Position: Sitting)   Pulse 91   Resp 10   Ht 1.577 m (5' 2.1\")   Wt 80.9 kg (178 lb 4.8 oz)   SpO2 98%   BMI 32.51 kg/m²       Physical Exam  Constitutional:       General: She is not in acute distress.     Appearance: She is not ill-appearing, toxic-appearing or diaphoretic.   HENT:      Head: Normocephalic and atraumatic.   Eyes:      Conjunctiva/sclera: Conjunctivae normal.   Neurological:      Mental Status: She is alert.         Assessment/Plan   Assessment & Plan  Medicare annual wellness visit, subsequent         Goals of care, counseling/discussion         Gastroesophageal reflux disease without esophagitis  -Symptoms improved after 1 month of omeprazole. They returned while pt was sick. She wants to see how symptoms change as she starts to eat again.       Hypothyroidism due to Hashimoto's thyroiditis  -Reviewed last years TSH w/ pt. Will continue current dosage. Rechecking lvls.  Orders:    levothyroxine (Synthroid, Levoxyl) 100 mcg tablet; Take 1 tablet " (100 mcg) by mouth once daily.    Tsh With Reflex To Free T4 If Abnormal; Future    Type 2 diabetes mellitus without complication, without long-term current use of insulin (Multi)  -Will recheck lvl before next appt. Just had dose adjusted.  Orders:    Hemoglobin A1c; Future    Comprehensive metabolic panel; Future    CBC; Future    Lipid panel; Future    Albumin-Creatinine Ratio, Urine Random; Future         Advance Directives Discussion  Advanced Care Planning (including a Living Will, Healthcare POA, as well as specific end of life choices and/or directives), was discussed with the patient and/or surrogate, voluntarily, and details of that discussion documented in the Problem List (under Advanced Directives Discussion) of the medical record.  Pt has a living will and a HCPOA. Pt isn't sure which of her sons is her medical POA vs her financial. Thinks Chidi is the HCPOA alternative after her . Pt confirms she wants to be full code.   (~16 min spent discussing above)

## 2025-01-14 NOTE — PATIENT INSTRUCTIONS
Please get labwork 1 week before your next appointment. Before you have labwork drawn please give the  lab a call to ensure you don't need to make a reservation.    Things you can do for cough:  -Can try throat lozenges like Hals or Ricola.  -Keep using your humidifier. Some people see benefit from tea and honey.  -One medication that can help is Delsym. If you need help clearing mucous then you can use mucinex or robitussin (but it takes a couple days of using this consistently to work).

## 2025-01-14 NOTE — ASSESSMENT & PLAN NOTE
-Will recheck lvl before next appt. Just had dose adjusted.  Orders:    Hemoglobin A1c; Future    Comprehensive metabolic panel; Future    CBC; Future    Lipid panel; Future    Albumin-Creatinine Ratio, Urine Random; Future

## 2025-01-23 PROCEDURE — RXMED WILLOW AMBULATORY MEDICATION CHARGE

## 2025-01-28 ENCOUNTER — APPOINTMENT (OUTPATIENT)
Dept: PHARMACY | Facility: HOSPITAL | Age: 80
End: 2025-01-28
Payer: MEDICARE

## 2025-01-28 DIAGNOSIS — E11.69 TYPE 2 DIABETES MELLITUS WITH OTHER SPECIFIED COMPLICATION, UNSPECIFIED WHETHER LONG TERM INSULIN USE (MULTI): ICD-10-CM

## 2025-01-28 NOTE — ASSESSMENT & PLAN NOTE
Patient's goal A1c is < 7%.  Is pt at goal? No, 7.6  Patient's SMBGs are improving 127-142. Patient tolerating with no side effects reported. Patient was sick for 2 weeks prior to appointment and would like to see what sugars do while healthy on increased dose.  Rationale for plan: Fasting more in range. Continue    Medication Changes:  CONTINUE:  Metformin 1000 mg twice daily  Trulicity 3 mg once weekly

## 2025-01-28 NOTE — PROGRESS NOTES
Clinical Pharmacy Appointment    Patient ID: Cherrie Garcia is a 79 y.o. female who presents for Diabetes.    Pt is here for Follow Up appointment.     Referring Provider/PCP: Praful Bernabe MD  Last visit with PCP: 1/14/25  Next visit with PCP: 6/17/25      Subjective     HPI  DIABETES MELLITUS TYPE II:    Diagnosed with diabetes: a little over 10 years per patients. Known diabetic complications: None.  Does patient follow with Endocrinology: No  Last optometry exam: 2024 (a few months ago)  Most recent visit in Podiatry: September 2024     Current diabetic medications include:  Trulicity 3 mg once weekly on Mondays x 3  Metformin 1000 mg twice daily    Clarifications to above regimen: None  Adverse Effects: None    Past diabetic medications include:  None    Glucose Readings:  Glucometer/CGM Type: glucometer  Patient tests BG 1 times per day    Current home BG readings: 127, 142, Average over last 7 days 134  Previous home BG readings:   Fasting 141  Fasting 139-148, Average 150  Fasting- 144, 157  Fasting- 155, 178    Any episodes of hypoglycemia? Yes,   .  Did patient treat episode of hypoglycemia appropriately? Yes, orange juice  Does the patient have a prescription for ready-to-use Glucagon? Not on insulin  Does pt have proteinuria? No    Lifestyle:   Diet: unknown meals/day.  BK: unknown   LN: unknown   DN: unknown   Snacks: unknown   Drinks: unknown     Physical Activity: unknown     Secondary Prevention:  Statin? Yes  ACE-I/ARB? Yes  Aspirin? Yes    Pertinent PMH Review:  PMH of Pancreatitis: No  PMH of Retinopathy: No  PMH of Urinary Tract Infections: No  PMH of MTC: No    Medication Reconciliation:  Changed:   Added:   Discontinued:     Drug Interactions  No relevant drug interactions were noted.    Medication System Management  [facility]  Adherence/Organization: No issues  Affordability/Accessibility: Trulicity through [facility] through 9/17/25      Objective   Allergies   Allergen  Reactions    Amoxicillin-Pot Clavulanate GI Upset     Can tolerate amoxicillin by itself (has gotten it for dental ppx).    Diclofenac Unknown    Mucinex [Guaifenesin] Unknown    Penicillins Unknown     Pt isn't sure what the allergy is or what the reaction was. Just knows she didn't tolerate augmentin.    Pseudoephedrine-Guaifenesin Unknown     Social History     Social History Narrative    Not on file      Medication Review  Current Outpatient Medications   Medication Instructions    aspirin 81 mg EC tablet 1 tablet, Daily    Calmoseptine 0.44-20.6 % ointment PLEASE SEE ATTACHED FOR DETAILED DIRECTIONS    cholecalciferol (VITAMIN D-3) 2,000 Units, Daily    lancets misc Test once daily as directed    levothyroxine (SYNTHROID, LEVOXYL) 100 mcg, oral, Daily    metFORMIN (GLUCOPHAGE) 1,000 mg, oral, 2 times daily (morning and late afternoon)    mometasone (Elocon) 0.1 % cream 1 Application, Daily    OneTouch Verio test strips strip 1 strip, transdermal, 3 times daily    simvastatin (Zocor) 20 mg tablet 1 tablet, Daily    Trulicity 3 mg, subcutaneous, Weekly    valsartan (DIOVAN) 80 mg, oral, Daily      Vitals  BP Readings from Last 2 Encounters:   01/14/25 99/64   10/25/24 107/66     BMI Readings from Last 1 Encounters:   01/14/25 32.51 kg/m²      Labs  A1C  Lab Results   Component Value Date    HGBA1C 7.6 (H) 08/12/2024    HGBA1C 8.8 (H) 04/29/2024    HGBA1C 7.3 (H) 12/05/2023     BMP  Lab Results   Component Value Date    CALCIUM 9.6 04/27/2024     (L) 04/27/2024    K 4.0 04/27/2024    CO2 25 04/27/2024    CL 98 04/27/2024    BUN 13 04/27/2024    CREATININE 0.70 04/27/2024    EGFR 88 04/27/2024     LFTs  Lab Results   Component Value Date    ALT 12 04/27/2024    AST 14 04/27/2024    ALKPHOS 35 04/27/2024    BILITOT 1.4 (H) 04/27/2024     FLP  Lab Results   Component Value Date    TRIG 187 (H) 12/05/2023    CHOL 156 12/05/2023    LDLF 35 06/16/2023    LDLCALC 57 12/05/2023    HDL 61.7 12/05/2023     Urine  Microalbumin  Lab Results   Component Value Date    MICROALBCREA  08/12/2024      Comment:      One or more analytes used in this calculation is outside of the analytical measurement range. Calculation cannot be performed.     Weight Management  Wt Readings from Last 3 Encounters:   01/14/25 80.9 kg (178 lb 4.8 oz)   10/25/24 82.9 kg (182 lb 12.8 oz)   09/04/24 83.9 kg (185 lb)      There is no height or weight on file to calculate BMI.     Assessment/Plan   Problem List Items Addressed This Visit       Type 2 diabetes mellitus     Patient's goal A1c is < 7%.  Is pt at goal? No, 7.6  Patient's SMBGs are improving 127-142. Patient tolerating with no side effects reported. Patient was sick for 2 weeks prior to appointment and would like to see what sugars do while healthy on increased dose.  Rationale for plan: Fasting more in range. Continue    Medication Changes:  CONTINUE:  Metformin 1000 mg twice daily  Trulicity 3 mg once weekly         Relevant Orders    Referral to Clinical Pharmacy         Clinical Pharmacist follow-up: 2/25 at 10 am, Telehealth visit    Continue all meds under the continuation of care with the referring provider and clinical pharmacy team.    Thank you,  Peyton Kelley, PharmD, [facility]  Clinical Pharmacy Specialist  [phone]    Verbal consent to manage patient's drug therapy was obtained from the patient. They were informed they may decline to participate or withdraw from participation in pharmacy services at any time.

## 2025-01-28 NOTE — Clinical Note
Patient tolerated increase of Trulicity to 3 mg well. Reports fasting sugar of 127 yesterday after 3rd injection. Will continue on 3 mg at this time and follow up next month to decide if we need to titrate up or not.

## 2025-01-30 ENCOUNTER — PHARMACY VISIT (OUTPATIENT)
Dept: PHARMACY | Facility: CLINIC | Age: 80
End: 2025-01-30
Payer: MEDICARE

## 2025-02-14 ENCOUNTER — TELEMEDICINE (OUTPATIENT)
Dept: PHARMACY | Facility: HOSPITAL | Age: 80
End: 2025-02-14
Payer: MEDICARE

## 2025-02-14 DIAGNOSIS — E11.69 TYPE 2 DIABETES MELLITUS WITH OTHER SPECIFIED COMPLICATION, UNSPECIFIED WHETHER LONG TERM INSULIN USE (MULTI): Primary | ICD-10-CM

## 2025-02-14 DIAGNOSIS — E11.69 TYPE 2 DIABETES MELLITUS WITH OTHER SPECIFIED COMPLICATION, UNSPECIFIED WHETHER LONG TERM INSULIN USE (MULTI): ICD-10-CM

## 2025-02-14 PROCEDURE — RXMED WILLOW AMBULATORY MEDICATION CHARGE

## 2025-02-14 RX ORDER — TIRZEPATIDE 5 MG/.5ML
5 INJECTION, SOLUTION SUBCUTANEOUS WEEKLY
Qty: 2 ML | Refills: 3 | Status: SHIPPED | OUTPATIENT
Start: 2025-02-14

## 2025-02-14 NOTE — ASSESSMENT & PLAN NOTE
Patient's goal A1c is < 7%.  Is pt at goal? No, 7.6  Patient's SMBGs are improving 127-142. Patient reports blurry vision the last two weeks, has seen retina specialist. Wants to try different medication.  Rationale for plan: Blurry vision, switch to Mounjaro    Medication Changes:  CONTINUE:  Metformin 1000 mg twice daily  STOP  Trulicity 3 mg once weekly  START  Mounjaro 5 mg once weekly

## 2025-02-14 NOTE — PROGRESS NOTES
Clinical Pharmacy Appointment    Patient ID: Cherrie Garcia is a 79 y.o. female who presents for Diabetes.    Pt is here for Follow Up appointment.     Referring Provider/PCP: Praful Bernabe MD  Last visit with PCP: 1/14/25  Next visit with PCP: 6/17/25      Subjective     HPI  DIABETES MELLITUS TYPE II:    Diagnosed with diabetes: a little over 10 years per patients. Known diabetic complications: None.  Does patient follow with Endocrinology: No  Last optometry exam: 2024 (a few months ago)  Most recent visit in Podiatry: September 2024     Current diabetic medications include:  Trulicity 3 mg once weekly on Mondays x 3  Metformin 1000 mg twice daily    Clarifications to above regimen: None  Adverse Effects: Blurry vision last 2 weeks    Past diabetic medications include:  None    Glucose Readings:  Glucometer/CGM Type: glucometer  Patient tests BG 1 times per day    Current home BG readings: 127, 142, Average over last 7 days 134  Previous home BG readings:   Fasting 141  Fasting 139-148, Average 150  Fasting- 144, 157  Fasting- 155, 178    Any episodes of hypoglycemia? Yes,   .  Did patient treat episode of hypoglycemia appropriately? Yes, orange juice  Does the patient have a prescription for ready-to-use Glucagon? Not on insulin  Does pt have proteinuria? No    Lifestyle:   Diet: unknown meals/day.  BK: unknown   LN: unknown   DN: unknown   Snacks: unknown   Drinks: unknown     Physical Activity: unknown     Secondary Prevention:  Statin? Yes  ACE-I/ARB? Yes  Aspirin? Yes    Pertinent PMH Review:  PMH of Pancreatitis: No  PMH of Retinopathy: No  PMH of Urinary Tract Infections: No  PMH of MTC: No    Medication Reconciliation:  Changed:   Added:   Discontinued:     Drug Interactions  No relevant drug interactions were noted.    Medication System Management  [facility]  Adherence/Organization: No issues  Affordability/Accessibility: Trulicity through [facility] through 9/17/25      Objective    Allergies   Allergen Reactions    Amoxicillin-Pot Clavulanate GI Upset     Can tolerate amoxicillin by itself (has gotten it for dental ppx).    Diclofenac Unknown    Mucinex [Guaifenesin] Unknown    Penicillins Unknown     Pt isn't sure what the allergy is or what the reaction was. Just knows she didn't tolerate augmentin.    Pseudoephedrine-Guaifenesin Unknown     Social History     Social History Narrative    Not on file      Medication Review  Current Outpatient Medications   Medication Instructions    aspirin 81 mg EC tablet 1 tablet, Daily    Calmoseptine 0.44-20.6 % ointment PLEASE SEE ATTACHED FOR DETAILED DIRECTIONS    cholecalciferol (VITAMIN D-3) 2,000 Units, Daily    lancets misc Test once daily as directed    levothyroxine (SYNTHROID, LEVOXYL) 100 mcg, oral, Daily    metFORMIN (GLUCOPHAGE) 1,000 mg, oral, 2 times daily (morning and late afternoon)    mometasone (Elocon) 0.1 % cream 1 Application, Daily    OneTouch Verio test strips strip 1 strip, transdermal, 3 times daily    simvastatin (Zocor) 20 mg tablet 1 tablet, Daily    Trulicity 3 mg, subcutaneous, Weekly    valsartan (DIOVAN) 80 mg, oral, Daily      Vitals  BP Readings from Last 2 Encounters:   01/14/25 99/64   10/25/24 107/66     BMI Readings from Last 1 Encounters:   01/14/25 32.51 kg/m²      Labs  A1C  Lab Results   Component Value Date    HGBA1C 7.6 (H) 08/12/2024    HGBA1C 8.8 (H) 04/29/2024    HGBA1C 7.3 (H) 12/05/2023     BMP  Lab Results   Component Value Date    CALCIUM 9.6 04/27/2024     (L) 04/27/2024    K 4.0 04/27/2024    CO2 25 04/27/2024    CL 98 04/27/2024    BUN 13 04/27/2024    CREATININE 0.70 04/27/2024    EGFR 88 04/27/2024     LFTs  Lab Results   Component Value Date    ALT 12 04/27/2024    AST 14 04/27/2024    ALKPHOS 35 04/27/2024    BILITOT 1.4 (H) 04/27/2024     FLP  Lab Results   Component Value Date    TRIG 187 (H) 12/05/2023    CHOL 156 12/05/2023    LDLF 35 06/16/2023    LDLCALC 57 12/05/2023    HDL 61.7  12/05/2023     Urine Microalbumin  Lab Results   Component Value Date    MICROALBCREA  08/12/2024      Comment:      One or more analytes used in this calculation is outside of the analytical measurement range. Calculation cannot be performed.     Weight Management  Wt Readings from Last 3 Encounters:   01/14/25 80.9 kg (178 lb 4.8 oz)   10/25/24 82.9 kg (182 lb 12.8 oz)   09/04/24 83.9 kg (185 lb)      There is no height or weight on file to calculate BMI.     Assessment/Plan   Problem List Items Addressed This Visit       Type 2 diabetes mellitus     Patient's goal A1c is < 7%.  Is pt at goal? No, 7.6  Patient's SMBGs are improving 127-142. Patient reports blurry vision the last two weeks, has seen retina specialist. Wants to try different medication.  Rationale for plan: Blurry vision, switch to Mounjaro    Medication Changes:  CONTINUE:  Metformin 1000 mg twice daily  STOP  Trulicity 3 mg once weekly  START  Mounjaro 5 mg once weekly             Patient Assistance Screening (VAF)  Patient verbally reports monthly or yearly income which is less than 400% federal poverty level  Application for program has been submitted for the following medications:   Mounjaro  Patient aware this process may take up to 2 weeks once income documents have been sent to the team.  If approved, medication must be filled through [facility] pharmacy and may be picked up or mailed to patient.   If approved, medication will be billed through insurance, and patient assistance team will pay the copay. This will result in a $0 copay for the patient.  Counseled patient on mechanism of action, side effects, contraindications, and what to do if the patient misses a dose. All patients questions were answered.     Clinical Pharmacist follow-up: 2/25 at 10 am, Telehealth visit    Continue all meds under the continuation of care with the referring provider and clinical pharmacy team.    Thank you,  Peyton Kelley, PharmD, BCPS  Clinical  Pharmacy Specialist  [phone]    Verbal consent to manage patient's drug therapy was obtained from the patient. They were informed they may decline to participate or withdraw from participation in pharmacy services at any time.

## 2025-02-14 NOTE — Clinical Note
Patient called reporting blurry vision the last 2 weeks that eye doctor suggested was related to her Trulicity, will try switching to Mounjaro to see if it improves.

## 2025-02-17 ENCOUNTER — PHARMACY VISIT (OUTPATIENT)
Dept: PHARMACY | Facility: CLINIC | Age: 80
End: 2025-02-17
Payer: MEDICARE

## 2025-02-25 ENCOUNTER — APPOINTMENT (OUTPATIENT)
Dept: PHARMACY | Facility: HOSPITAL | Age: 80
End: 2025-02-25
Payer: MEDICARE

## 2025-02-25 DIAGNOSIS — E11.69 TYPE 2 DIABETES MELLITUS WITH OTHER SPECIFIED COMPLICATION, UNSPECIFIED WHETHER LONG TERM INSULIN USE (MULTI): ICD-10-CM

## 2025-02-25 NOTE — ASSESSMENT & PLAN NOTE
Patient's goal A1c is < 7%.  Is pt at goal? No, 7.6  Patient's SMBGs are mildly worsened, fasting 140-150. Patient reports blurry vision has resolved from left eye, still in right eye, now dealing with stye. S/p 1 injection of mounjaro last week.  Rationale for plan: Blurry vision improving, continue mounjaro    Medication Changes:  CONTINUE:  Metformin 1000 mg twice daily  INCREASE  Mounjaro 5 to 7.5 mg once weekly

## 2025-02-25 NOTE — PROGRESS NOTES
Clinical Pharmacy Appointment    Patient ID: Cherrie Garcia is a 79 y.o. female who presents for Diabetes.    Pt is here for Follow Up appointment.     Referring Provider/PCP: Praful Bernabe MD  Last visit with PCP: 1/14/25  Next visit with PCP: 6/17/25      Subjective     HPI  DIABETES MELLITUS TYPE II:    Diagnosed with diabetes: a little over 10 years per patients. Known diabetic complications: None.  Does patient follow with Endocrinology: No  Last optometry exam: 2024 (a few months ago)  Most recent visit in Podiatry: September 2024     Current diabetic medications include:  Mounjaro 5 mg on Wednesday x 1  Metformin 1000 mg twice daily    Clarifications to above regimen: None  Adverse Effects: Slight blurriness in right eye. Slight constipation (takes stool softeners as needed)    Past diabetic medications include:  Trulicity 3 mg once weekly on Mondays x 3- cloudy vission    Glucose Readings:  Glucometer/CGM Type: glucometer  Patient tests BG 1 times per day    Current home BG readings: 143, 151, Average over last 7 days 156  Previous home BG readings:   127, 142, Average over last 7 days 134  Fasting 141  Fasting 139-148, Average 150  Fasting- 144, 157  Fasting- 155, 178    Any episodes of hypoglycemia? Yes,   .  Did patient treat episode of hypoglycemia appropriately? Yes, orange juice  Does the patient have a prescription for ready-to-use Glucagon? Not on insulin  Does pt have proteinuria? No    Lifestyle:   Diet: unknown meals/day.  BK: unknown   LN: unknown   DN: unknown   Snacks: unknown   Drinks: unknown     Physical Activity: unknown     Secondary Prevention:  Statin? Yes  ACE-I/ARB? Yes  Aspirin? Yes    Pertinent PMH Review:  PMH of Pancreatitis: No  PMH of Retinopathy: No  PMH of Urinary Tract Infections: No  PMH of MTC: No    Medication Reconciliation:  Changed:   Added:   Discontinued:     Drug Interactions  No relevant drug interactions were noted.    Medication System Management  Patients  preferred [facility]: [facility]  Adherence/Organization: No issues  Affordability/Accessibility: Mounjaro through  [facility] through 9/17/25      Objective   Allergies   Allergen Reactions    Amoxicillin-Pot Clavulanate GI Upset     Can tolerate amoxicillin by itself (has gotten it for dental ppx).    Diclofenac Unknown    Mucinex [Guaifenesin] Unknown    Penicillins Unknown     Pt isn't sure what the allergy is or what the reaction was. Just knows she didn't tolerate augmentin.    Pseudoephedrine-Guaifenesin Unknown     Social History     Social History Narrative    Not on file      Medication Review  Current Outpatient Medications   Medication Instructions    aspirin 81 mg EC tablet 1 tablet, Daily    Calmoseptine 0.44-20.6 % ointment PLEASE SEE ATTACHED FOR DETAILED DIRECTIONS    cholecalciferol (VITAMIN D-3) 2,000 Units, Daily    lancets misc Test once daily as directed    levothyroxine (SYNTHROID, LEVOXYL) 100 mcg, oral, Daily    metFORMIN (GLUCOPHAGE) 1,000 mg, oral, 2 times daily (morning and late afternoon)    mometasone (Elocon) 0.1 % cream 1 Application, Daily    Mounjaro 5 mg, subcutaneous, Weekly    OneTouch Verio test strips strip 1 strip, transdermal, 3 times daily    simvastatin (Zocor) 20 mg tablet 1 tablet, Daily    valsartan (DIOVAN) 80 mg, oral, Daily      Vitals  BP Readings from Last 2 Encounters:   01/14/25 99/64   10/25/24 107/66     BMI Readings from Last 1 Encounters:   01/14/25 32.51 kg/m²      Labs  A1C  Lab Results   Component Value Date    HGBA1C 7.6 (H) 08/12/2024    HGBA1C 8.8 (H) 04/29/2024    HGBA1C 7.3 (H) 12/05/2023     BMP  Lab Results   Component Value Date    CALCIUM 9.6 04/27/2024     (L) 04/27/2024    K 4.0 04/27/2024    CO2 25 04/27/2024    CL 98 04/27/2024    BUN 13 04/27/2024    CREATININE 0.70 04/27/2024    EGFR 88 04/27/2024     LFTs  Lab Results   Component Value Date    ALT 12 04/27/2024    AST 14 04/27/2024    ALKPHOS 35 04/27/2024    BILITOT 1.4 (H) 04/27/2024     [facility]  Lab  Results   Component Value Date    TRIG 187 (H) 12/05/2023    CHOL 156 12/05/2023    LDLF 35 06/16/2023    LDLCALC 57 12/05/2023    HDL 61.7 12/05/2023     Urine Microalbumin  Lab Results   Component Value Date    MICROALBCREA  08/12/2024      Comment:      One or more analytes used in this calculation is outside of the analytical measurement range. Calculation cannot be performed.     Weight Management  Wt Readings from Last 3 Encounters:   01/14/25 80.9 kg (178 lb 4.8 oz)   10/25/24 82.9 kg (182 lb 12.8 oz)   09/04/24 83.9 kg (185 lb)      There is no height or weight on file to calculate BMI.     Assessment/Plan   Problem List Items Addressed This Visit       Type 2 diabetes mellitus     Patient's goal A1c is < 7%.  Is pt at goal? No, 7.6  Patient's SMBGs are mildly worsened, fasting 140-150. Patient reports blurry vision has resolved from left eye, still in right eye, now dealing with stye. S/p 1 injection of mounjaro last week.  Rationale for plan: Blurry vision improving, continue mounjaro    Medication Changes:  CONTINUE:  Metformin 1000 mg twice daily  INCREASE  Mounjaro 5 to 7.5 mg once weekly         Relevant Orders    Referral to Clinical Pharmacy     Clinical Pharmacist follow-up: 3/11 at 10 am, Telehealth visit    Continue all meds under the continuation of care with the referring provider and clinical pharmacy team.    Thank you,  Peyton Kelley, PharmD, [facility]  Clinical Pharmacy Specialist  [phone]    Verbal consent to manage patient's drug therapy was obtained from the patient. They were informed they may decline to participate or withdraw from participation in pharmacy services at any time.

## 2025-02-25 NOTE — Clinical Note
After switch from Trulicity to Mounjaro x 1 injection, patient reports blurry is improving. Will follow up in 2 weeks to see if mounjaro needs titrated.

## 2025-03-10 PROCEDURE — RXMED WILLOW AMBULATORY MEDICATION CHARGE

## 2025-03-11 ENCOUNTER — APPOINTMENT (OUTPATIENT)
Dept: PHARMACY | Facility: HOSPITAL | Age: 80
End: 2025-03-11
Payer: MEDICARE

## 2025-03-11 DIAGNOSIS — E11.69 TYPE 2 DIABETES MELLITUS WITH OTHER SPECIFIED COMPLICATION, UNSPECIFIED WHETHER LONG TERM INSULIN USE (MULTI): ICD-10-CM

## 2025-03-11 RX ORDER — LANCETS
EACH MISCELLANEOUS
Qty: 100 EACH | Refills: 3 | Status: SHIPPED | OUTPATIENT
Start: 2025-03-11

## 2025-03-11 NOTE — Clinical Note
Patient doing well on transition from Trulicity to Mounjaro s/p blurry vision on Trulicity. Has done 3 injections of the Mounjaro and just lingering blurry vision in right eye (saw eye doctor this week). Will keep everything the same at this time and see if blurriness resolves in a few weeks or not.

## 2025-03-11 NOTE — ASSESSMENT & PLAN NOTE
Patient's goal A1c is < 7%.  Is pt at goal? No, 7.6  Patient's SMBGs are mildly worsened, fasting 140-1190. Patient reports blurry vision has resolved from left eye, still in right eye, now dealing with stye. S/p 3 injections of mounjaro last week. Patient seeing eye doctor.   Rationale for plan: Blurry vision improving, continue mounjaro    Medication Changes:  CONTINUE:  Metformin 1000 mg twice daily  Mounjaro 5 mg once weekly

## 2025-03-11 NOTE — PROGRESS NOTES
Clinical Pharmacy Appointment    Patient ID: Cherrie Garcia is a 79 y.o. female who presents for Diabetes.    Pt is here for Follow Up appointment.     Referring Provider/PCP: Praful Bernabe MD  Last visit with PCP: 1/14/25  Next visit with PCP: 6/17/25      Subjective     HPI  DIABETES MELLITUS TYPE II:    Diagnosed with diabetes: a little over 10 years per patients. Known diabetic complications: None.  Does patient follow with Endocrinology: No  Last optometry exam: 2024 (a few months ago)  Most recent visit in Podiatry: September 2024     Current diabetic medications include:  Mounjaro 5 mg on Wednesday x 3  Metformin 1000 mg twice daily    Clarifications to above regimen: None  Adverse Effects: Slight blurriness in right eye. Slight constipation (takes stool softeners as needed)    Past diabetic medications include:  Trulicity 3 mg once weekly on Mondays x 3- cloudy vision    Glucose Readings:  Glucometer/CGM Type: glucometer  Patient tests BG 1 times per day    Current home BG readings: 147, 140 in the 150-190 range last week  Previous home BG readings:  143, 151, Average over last 7 days 156  127, 142, Average over last 7 days 134  Fasting 141  Fasting 139-148, Average 150  Fasting- 144, 157  Fasting- 155, 178    Any episodes of hypoglycemia? Yes,   .  Did patient treat episode of hypoglycemia appropriately? Yes, orange juice  Does the patient have a prescription for ready-to-use Glucagon? Not on insulin  Does pt have proteinuria? No    Lifestyle:   Diet: unknown meals/day.  BK: unknown   LN: unknown   DN: unknown   Snacks: unknown   Drinks: unknown     Physical Activity: unknown     Secondary Prevention:  Statin? Yes  ACE-I/ARB? Yes  Aspirin? Yes    Pertinent PMH Review:  PMH of Pancreatitis: No  PMH of Retinopathy: No  PMH of Urinary Tract Infections: No  PMH of MTC: No    Medication Reconciliation:  Changed:   Added:   Discontinued:     Drug Interactions  No relevant drug interactions were  noted.    Medication System Management  [facility]  Adherence/Organization: No issues  Affordability/Accessibility: [facility] through  [facility] through 9/17/25      Objective   Allergies   Allergen Reactions    Amoxicillin-Pot Clavulanate GI Upset     Can tolerate amoxicillin by itself (has gotten it for dental ppx).    Diclofenac Unknown    Mucinex [Guaifenesin] Unknown    Penicillins Unknown     Pt isn't sure what the allergy is or what the reaction was. Just knows she didn't tolerate augmentin.    Pseudoephedrine-Guaifenesin Unknown     Social History     Social History Narrative    Not on file      Medication Review  Current Outpatient Medications   Medication Instructions    aspirin 81 mg EC tablet 1 tablet, Daily    Calmoseptine 0.44-20.6 % ointment PLEASE SEE ATTACHED FOR DETAILED DIRECTIONS    cholecalciferol (VITAMIN D-3) 2,000 Units, Daily    lancets misc Test once daily as directed    levothyroxine (SYNTHROID, LEVOXYL) 100 mcg, oral, Daily    metFORMIN (GLUCOPHAGE) 1,000 mg, oral, 2 times daily (morning and late afternoon)    mometasone (Elocon) 0.1 % cream 1 Application, Daily    Mounjaro 5 mg, subcutaneous, Weekly    OneTouch Verio test strips strip 1 strip, transdermal, 3 times daily    simvastatin (Zocor) 20 mg tablet 1 tablet, Daily    valsartan (DIOVAN) 80 mg, oral, Daily      Vitals  BP Readings from Last 2 Encounters:   01/14/25 99/64   10/25/24 107/66     BMI Readings from Last 1 Encounters:   01/14/25 32.51 kg/m²      Labs  A1C  Lab Results   Component Value Date    HGBA1C 7.6 (H) 08/12/2024    HGBA1C 8.8 (H) 04/29/2024    HGBA1C 7.3 (H) 12/05/2023     BMP  Lab Results   Component Value Date    CALCIUM 9.6 04/27/2024     (L) 04/27/2024    K 4.0 04/27/2024    CO2 25 04/27/2024    CL 98 04/27/2024    BUN 13 04/27/2024    CREATININE 0.70 04/27/2024    EGFR 88 04/27/2024     LFTs  Lab Results   Component Value Date    ALT 12 04/27/2024    AST 14 04/27/2024    ALKPHOS 35  04/27/2024    BILITOT 1.4 (H) 04/27/2024     FLP  Lab Results   Component Value Date    TRIG 187 (H) 12/05/2023    CHOL 156 12/05/2023    LDLF 35 06/16/2023    LDLCALC 57 12/05/2023    HDL 61.7 12/05/2023     Urine Microalbumin  Lab Results   Component Value Date    MICROALBCREA  08/12/2024      Comment:      One or more analytes used in this calculation is outside of the analytical measurement range. Calculation cannot be performed.     Weight Management  Wt Readings from Last 3 Encounters:   01/14/25 80.9 kg (178 lb 4.8 oz)   10/25/24 82.9 kg (182 lb 12.8 oz)   09/04/24 83.9 kg (185 lb)      There is no height or weight on file to calculate BMI.     Assessment/Plan   Problem List Items Addressed This Visit       Type 2 diabetes mellitus     Patient's goal A1c is < 7%.  Is pt at goal? No, 7.6  Patient's SMBGs are mildly worsened, fasting 140-1190. Patient reports blurry vision has resolved from left eye, still in right eye, now dealing with stye. S/p 3 injections of mounjaro last week. Patient seeing eye doctor.   Rationale for plan: Blurry vision improving, continue mounjaro    Medication Changes:  CONTINUE:  Metformin 1000 mg twice daily  Mounjaro 5 mg once weekly         Relevant Orders    Referral to Clinical Pharmacy       Clinical Pharmacist follow-up: 3/25 at 10 am, Telehealth visit    Continue all meds under the continuation of care with the referring provider and clinical pharmacy team.    Thank you,  Peyton Kelley, PharmD, [facility]  Clinical Pharmacy Specialist  [phone]    Verbal consent to manage patient's drug therapy was obtained from the patient. They were informed they may decline to participate or withdraw from participation in pharmacy services at any time.

## 2025-03-13 ENCOUNTER — PHARMACY VISIT (OUTPATIENT)
Dept: PHARMACY | Facility: CLINIC | Age: 80
End: 2025-03-13
Payer: MEDICARE

## 2025-03-25 ENCOUNTER — APPOINTMENT (OUTPATIENT)
Dept: PHARMACY | Facility: HOSPITAL | Age: 80
End: 2025-03-25
Payer: MEDICARE

## 2025-03-25 DIAGNOSIS — E11.69 TYPE 2 DIABETES MELLITUS WITH OTHER SPECIFIED COMPLICATION, UNSPECIFIED WHETHER LONG TERM INSULIN USE (MULTI): ICD-10-CM

## 2025-03-25 PROCEDURE — RXMED WILLOW AMBULATORY MEDICATION CHARGE

## 2025-03-25 RX ORDER — TIRZEPATIDE 7.5 MG/.5ML
7.5 INJECTION, SOLUTION SUBCUTANEOUS WEEKLY
Qty: 2 ML | Refills: 11 | Status: SHIPPED | OUTPATIENT
Start: 2025-03-25

## 2025-03-25 NOTE — Clinical Note
Patient's blurry vision continuing to resolve as getting further away from last Trulicity injection. Will increase Mounjaro from 5 mg to 7.5 mg at this time.

## 2025-03-25 NOTE — PROGRESS NOTES
Clinical Pharmacy Appointment    Patient ID: Cherrie Garcia is a 79 y.o. female who presents for Diabetes.    Pt is here for Follow Up appointment.     Referring Provider/PCP: Praful Bernabe MD  Last visit with PCP: 1/14/25  Next visit with PCP: 6/17/25      Subjective     Interval History  Blurry vision has been getting better, Had a cold last week.    HPI  DIABETES MELLITUS TYPE II:    Diagnosed with diabetes: a little over 10 years per patients.   Known diabetic complications: None.  Does patient follow with Endocrinology: No  Last optometry exam: 2024 (a few months ago)  Most recent visit in Podiatry: September 2024     Current diabetic medications include:  Mounjaro 5 mg on Wednesday  Metformin 1000 mg twice daily    Clarifications to above regimen: None  Adverse Effects: Slight blurriness in right eye keep getting better.    Past diabetic medications include:  Trulicity 3 mg once weekly on Mondays x 3- cloudy vision    Glucose Readings:  Glucometer/CGM Type: glucometer  Patient tests BG 1 times per day    Current home BG readings: 147, 159  Previous home BG readings:  147, 140 in the 150-190 range last week  143, 151, Average over last 7 days 156  127, 142, Average over last 7 days 134  Fasting 141  Fasting 139-148, Average 150  Fasting- 144, 157  Fasting- 155, 178    Any episodes of hypoglycemia? Yes,   .  Did patient treat episode of hypoglycemia appropriately? Yes, orange juice  Does the patient have a prescription for ready-to-use Glucagon? Not on insulin  Does pt have proteinuria? No    Lifestyle:   Diet: unknown meals/day.  BK: unknown   LN: unknown   DN: unknown   Snacks: unknown   Drinks: unknown     Physical Activity: unknown     Secondary Prevention:  Statin? Yes  ACE-I/ARB? Yes  Aspirin? Yes    Pertinent PMH Review:  PMH of Pancreatitis: No  PMH of Retinopathy: No  PMH of Urinary Tract Infections: No  PMH of MTC: No    Medication Reconciliation:  Changed:   Added:   Discontinued:     Drug  Interactions  No relevant drug interactions were noted.    Medication System Management  [facility]  Adherence/Organization: No issues  Affordability/Accessibility: Mounjaro through [facility] through 9/17/25      Objective   Allergies   Allergen Reactions    Amoxicillin-Pot Clavulanate GI Upset     Can tolerate amoxicillin by itself (has gotten it for dental ppx).    Diclofenac Unknown    Mucinex [Guaifenesin] Unknown    Penicillins Unknown     Pt isn't sure what the allergy is or what the reaction was. Just knows she didn't tolerate augmentin.    Pseudoephedrine-Guaifenesin Unknown     Social History     Social History Narrative    Not on file      Medication Review  Current Outpatient Medications   Medication Instructions    aspirin 81 mg EC tablet 1 tablet, Daily    Calmoseptine 0.44-20.6 % ointment PLEASE SEE ATTACHED FOR DETAILED DIRECTIONS    cholecalciferol (VITAMIN D-3) 2,000 Units, Daily    lancets misc Test once daily as directed    levothyroxine (SYNTHROID, LEVOXYL) 100 mcg, oral, Daily    metFORMIN (GLUCOPHAGE) 1,000 mg, oral, 2 times daily (morning and late afternoon)    mometasone (Elocon) 0.1 % cream 1 Application, Daily    Mounjaro 7.5 mg, subcutaneous, Weekly    OneTouch Verio test strips strip 1 strip, transdermal, 3 times daily    simvastatin (Zocor) 20 mg tablet 1 tablet, Daily    valsartan (DIOVAN) 80 mg, oral, Daily      Vitals  BP Readings from Last 2 Encounters:   01/14/25 99/64   10/25/24 107/66     BMI Readings from Last 1 Encounters:   01/14/25 32.51 kg/m²      Labs  A1C  Lab Results   Component Value Date    HGBA1C 7.6 (H) 08/12/2024    HGBA1C 8.8 (H) 04/29/2024    HGBA1C 7.3 (H) 12/05/2023     BMP  Lab Results   Component Value Date    CALCIUM 9.6 04/27/2024     (L) 04/27/2024    K 4.0 04/27/2024    CO2 25 04/27/2024    CL 98 04/27/2024    BUN 13 04/27/2024    CREATININE 0.70 04/27/2024    EGFR 88 04/27/2024     LFTs  Lab Results   Component Value Date    ALT 12  04/27/2024    AST 14 04/27/2024    ALKPHOS 35 04/27/2024    BILITOT 1.4 (H) 04/27/2024     FLP  Lab Results   Component Value Date    TRIG 187 (H) 12/05/2023    CHOL 156 12/05/2023    LDLF 35 06/16/2023    LDLCALC 57 12/05/2023    HDL 61.7 12/05/2023     Urine Microalbumin  Lab Results   Component Value Date    MICROALBCREA  08/12/2024      Comment:      One or more analytes used in this calculation is outside of the analytical measurement range. Calculation cannot be performed.     Weight Management  Wt Readings from Last 3 Encounters:   01/14/25 80.9 kg (178 lb 4.8 oz)   10/25/24 82.9 kg (182 lb 12.8 oz)   09/04/24 83.9 kg (185 lb)      There is no height or weight on file to calculate BMI.     Assessment/Plan   Problem List Items Addressed This Visit       Type 2 diabetes mellitus     Patient's goal A1c is < 7%.  Is pt at goal? No, 7.6  Patient's SMBGs are stable, 140-150. Patient reports blurry vision has resolved from left eye, continuing to get better in right eye. Patient agreeable to try higher dose of Mounjaro at this time.  Rationale for plan: Blurry vision improving, increase mounjaro    Medication Changes:  CONTINUE:  Metformin 1000 mg twice daily  INCREASE  Mounjaro 5 to 10 mg once weekly         Relevant Medications    tirzepatide (Mounjaro) 7.5 mg/0.5 mL pen injector    Other Relevant Orders    Referral to Clinical Pharmacy     Clinical Pharmacist follow-up: 4/29 at 10 am, Telehealth visit    Continue all meds under the continuation of care with the referring provider and clinical pharmacy team.    Thank you,  Peyton Kelley, PharmD, [facility]  Clinical Pharmacy Specialist  [phone]    Verbal consent to manage patient's drug therapy was obtained from the patient. They were informed they may decline to participate or withdraw from participation in pharmacy services at any time.

## 2025-03-25 NOTE — ASSESSMENT & PLAN NOTE
Patient's goal A1c is < 7%.  Is pt at goal? No, 7.6  Patient's SMBGs are stable, 140-150. Patient reports blurry vision has resolved from left eye, continuing to get better in right eye. Patient agreeable to try higher dose of Mounjaro at this time.  Rationale for plan: Blurry vision improving, increase mounjaro    Medication Changes:  CONTINUE:  Metformin 1000 mg twice daily  INCREASE  Mounjaro 5 to 10 mg once weekly

## 2025-04-01 ENCOUNTER — PHARMACY VISIT (OUTPATIENT)
Dept: PHARMACY | Facility: CLINIC | Age: 80
End: 2025-04-01
Payer: MEDICARE

## 2025-04-29 ENCOUNTER — APPOINTMENT (OUTPATIENT)
Dept: PHARMACY | Facility: HOSPITAL | Age: 80
End: 2025-04-29
Payer: MEDICARE

## 2025-04-29 DIAGNOSIS — E11.69 TYPE 2 DIABETES MELLITUS WITH OTHER SPECIFIED COMPLICATION, UNSPECIFIED WHETHER LONG TERM INSULIN USE (MULTI): ICD-10-CM

## 2025-04-29 NOTE — ASSESSMENT & PLAN NOTE
Patient's goal A1c is < 7%.  Is pt at goal? No, 7.6  Patient's SMBGs are improved, 120's. Patient reports  is currently in hospital, so not sure if fatigue and lack of hunger is from that or the medication. Would like to keep everything the same for now.  Rationale for plan: Blurry vision improving, Continue    Medication Changes:  CONTINUE:  Metformin 1000 mg twice daily  Mounjaro 7.5 mg once weekly

## 2025-04-29 NOTE — PROGRESS NOTES
Clinical Pharmacy Appointment    Patient ID: Cherrie Garcia is a 79 y.o. female who presents for Diabetes.    Pt is here for Follow Up appointment.     Referring Provider/PCP: Praful Bernabe MD  Last visit with PCP: 1/14/25  Next visit with PCP: 6/17/25    Subjective     HPI  DIABETES MELLITUS TYPE II:    Diagnosed with diabetes: a little over 10 years per patients.   Known diabetic complications: None.  Does patient follow with Endocrinology: No  Last optometry exam: 2024 (a few months ago)  Most recent visit in Podiatry: September 2024     Current diabetic medications include:  Mounjaro 7.5 mg on Wednesday x 2  Metformin 1000 mg twice daily    Clarifications to above regimen: None  Adverse Effects: Slight blurriness in right eye keep getting better.    Past diabetic medications include:  Trulicity 3 mg once weekly on Mondays x 3- cloudy vision    Glucose Readings:  Glucometer/CGM Type: glucometer  Patient tests BG 1 times per day    Current home BG readings: 122, 129 (but not eating well due to  being in the hospital)  Previous home BG readings:  147, 159  147, 140 in the 150-190 range last week  143, 151, Average over last 7 days 156  127, 142, Average over last 7 days 134  Fasting 141  Fasting 139-148, Average 150  Fasting- 144, 157  Fasting- 155, 178    Any episodes of hypoglycemia? Yes,   .  Did patient treat episode of hypoglycemia appropriately? Yes, orange juice  Does the patient have a prescription for ready-to-use Glucagon? Not on insulin  Does pt have proteinuria? No    Lifestyle: (Not feeling like eating)  Diet: unknown meals/day.  BK: unknown   LN: unknown   DN: unknown   Snacks: unknown   Drinks: unknown     Physical Activity: unknown     Secondary Prevention:  Statin? Yes  ACE-I/ARB? Yes  Aspirin? Yes    Pertinent PMH Review:  PMH of Pancreatitis: No  PMH of Retinopathy: No  PMH of Urinary Tract Infections: No  PMH of MTC: No    Medication Reconciliation:  Changed:   Added:   Discontinued:      Drug Interactions  No relevant drug interactions were noted.    Medication System Management  [facility]  Adherence/Organization: No issues  Affordability/Accessibility: Mounjaro through [facility] through 9/17/25      Objective   Allergies   Allergen Reactions    Amoxicillin-Pot Clavulanate GI Upset     Can tolerate amoxicillin by itself (has gotten it for dental ppx).    Diclofenac Unknown    Mucinex [Guaifenesin] Unknown    Penicillins Unknown     Pt isn't sure what the allergy is or what the reaction was. Just knows she didn't tolerate augmentin.    Pseudoephedrine-Guaifenesin Unknown     Social History     Social History Narrative    Not on file      Medication Review  Current Outpatient Medications   Medication Instructions    aspirin 81 mg EC tablet 1 tablet, Daily    Calmoseptine 0.44-20.6 % ointment PLEASE SEE ATTACHED FOR DETAILED DIRECTIONS    cholecalciferol (VITAMIN D-3) 2,000 Units, Daily    lancets misc Test once daily as directed    levothyroxine (SYNTHROID, LEVOXYL) 100 mcg, oral, Daily    metFORMIN (GLUCOPHAGE) 1,000 mg, oral, 2 times daily (morning and late afternoon)    mometasone (Elocon) 0.1 % cream 1 Application, Daily    Mounjaro 7.5 mg, subcutaneous, Weekly    OneTouch Verio test strips strip 1 strip, transdermal, 3 times daily    simvastatin (Zocor) 20 mg tablet 1 tablet, Daily    valsartan (DIOVAN) 80 mg, oral, Daily      Vitals  BP Readings from Last 2 Encounters:   01/14/25 99/64   10/25/24 107/66     BMI Readings from Last 1 Encounters:   01/14/25 32.51 kg/m²      Labs  A1C  Lab Results   Component Value Date    HGBA1C 7.6 (H) 08/12/2024    HGBA1C 8.8 (H) 04/29/2024    HGBA1C 7.3 (H) 12/05/2023     BMP  Lab Results   Component Value Date    CALCIUM 9.6 04/27/2024     (L) 04/27/2024    K 4.0 04/27/2024    CO2 25 04/27/2024    CL 98 04/27/2024    BUN 13 04/27/2024    CREATININE 0.70 04/27/2024    EGFR 88 04/27/2024     LFTs  Lab Results   Component Value Date     ALT 12 04/27/2024    AST 14 04/27/2024    ALKPHOS 35 04/27/2024    BILITOT 1.4 (H) 04/27/2024     FLP  Lab Results   Component Value Date    TRIG 187 (H) 12/05/2023    CHOL 156 12/05/2023    LDLF 35 06/16/2023    LDLCALC 57 12/05/2023    HDL 61.7 12/05/2023     Urine Microalbumin  Lab Results   Component Value Date    MICROALBCREA  08/12/2024      Comment:      One or more analytes used in this calculation is outside of the analytical measurement range. Calculation cannot be performed.     Weight Management  Wt Readings from Last 3 Encounters:   01/14/25 80.9 kg (178 lb 4.8 oz)   10/25/24 82.9 kg (182 lb 12.8 oz)   09/04/24 83.9 kg (185 lb)      There is no height or weight on file to calculate BMI.     Assessment/Plan   Problem List Items Addressed This Visit       Type 2 diabetes mellitus    Patient's goal A1c is < 7%.  Is pt at goal? No, 7.6  Patient's SMBGs are improved, 120's. Patient reports  is currently in hospital, so not sure if fatigue and lack of hunger is from that or the medication. Would like to keep everything the same for now.  Rationale for plan: Blurry vision improving, Continue    Medication Changes:  CONTINUE:  Metformin 1000 mg twice daily  Mounjaro 7.5 mg once weekly         Relevant Orders    Referral to Clinical Pharmacy       Clinical Pharmacist follow-up: 5/27 at 1040, Telehealth visit    Continue all meds under the continuation of care with the referring provider and clinical pharmacy team.    Thank you,  Peyton Kelley, PharmD, [facility]  Clinical Pharmacy Specialist  [phone]    Verbal consent to manage patient's drug therapy was obtained from the patient. They were informed they may decline to participate or withdraw from participation in pharmacy services at any time.

## 2025-04-30 PROCEDURE — RXMED WILLOW AMBULATORY MEDICATION CHARGE

## 2025-05-03 ENCOUNTER — PHARMACY VISIT (OUTPATIENT)
Dept: PHARMACY | Facility: CLINIC | Age: 80
End: 2025-05-03
Payer: MEDICARE

## 2025-05-12 DIAGNOSIS — I10 PRIMARY HYPERTENSION: ICD-10-CM

## 2025-05-12 RX ORDER — VALSARTAN 80 MG/1
80 TABLET ORAL DAILY
Qty: 90 TABLET | Refills: 3 | Status: SHIPPED | OUTPATIENT
Start: 2025-05-12

## 2025-05-27 ENCOUNTER — APPOINTMENT (OUTPATIENT)
Dept: PHARMACY | Facility: HOSPITAL | Age: 80
End: 2025-05-27
Payer: MEDICARE

## 2025-05-27 DIAGNOSIS — E11.69 TYPE 2 DIABETES MELLITUS WITH OTHER SPECIFIED COMPLICATION, UNSPECIFIED WHETHER LONG TERM INSULIN USE (MULTI): ICD-10-CM

## 2025-05-27 NOTE — PROGRESS NOTES
Clinical Pharmacy Appointment    Patient ID: Cherrie Garcia is a 80 y.o. female who presents for Diabetes.    Pt is here for Follow Up appointment.     Referring Provider/PCP: Praful Bernabe MD  Last visit with PCP: 1/14/25  Next visit with PCP: 6/17/25    Subjective     Interval history:  She stopped Mounjaro 3 weeks ago due to fatigue and blurry vision (which reports occurred with Trulicity in past as well). She has PMH macular degeneration. She was found to have eye infection, currently on antibiotic drops and has follow up eye appointment next week. Despite stopping Mounjaro blood sugars have remained well controlled.   Significant stress with 's health. Reports he is coming home today from rehab after a difficult surgery.    DIABETES MELLITUS TYPE II:    Diagnosed with diabetes: a little over 10 years per patient  Known diabetic complications: None.  Does patient follow with Endocrinology: No  Last optometry exam: 2024   Most recent visit in Podiatry: September 2024     Current diabetic medications include:  Mounjaro 7.5 mg on Wednesday - self stopped 5/14  Metformin 1000 mg twice daily    Clarifications to above regimen: None  Adverse Effects: fatigue, blurry vision?    Past diabetic medications include:  Trulicity 3 mg once weekly on Mondays x 3- cloudy vision  Glipizide XL (replaced with trulicity)    Glucose Readings:  Glucometer/CGM Type: glucometer  Patient tests BG 1 times per day    Current home BG readings: AM -135    Previous home BG readings:  122, 129 (but not eating well due to  being in the hospital)  147, 159  147, 140 in the 150-190 range last week  143, 151, Average over last 7 days 156  127, 142, Average over last 7 days 134  Fasting 141  Fasting 139-148, Average 150  Fasting- 144, 157  Fasting- 155, 178    Any episodes of hypoglycemia? Yes,  .  Did patient treat episode of hypoglycemia appropriately? Yes, orange juice  Does the patient have a prescription for  ready-to-use Glucagon? Not on insulin  Does pt have proteinuria? No    Lifestyle: (Not feeling like eating)  Diet: unknown meals/day.  BK: unknown   LN: unknown   DN: unknown   Snacks: unknown   Drinks: unknown     Physical Activity: unknown     Secondary Prevention:  Statin? Yes  ACE-I/ARB? Yes  Aspirin? Yes    Pertinent PMH Review:  PMH of Pancreatitis: No  PMH of Retinopathy: No  PMH of Urinary Tract Infections: No  PMH of MTC: No    Medication Reconciliation:  Changed:   Added:   Discontinued:     Drug Interactions  No relevant drug interactions were noted.    Medication System Management  [facility]  Adherence/Organization: No issues  Affordability/Accessibility: Mounjaro through  [facility] through 9/17/25    Objective   Allergies   Allergen Reactions    Amoxicillin-Pot Clavulanate GI Upset     Can tolerate amoxicillin by itself (has gotten it for dental ppx).    Diclofenac Unknown    Mucinex [Guaifenesin] Unknown    Penicillins Unknown     Pt isn't sure what the allergy is or what the reaction was. Just knows she didn't tolerate augmentin.    Pseudoephedrine-Guaifenesin Unknown     Social History     Social History Narrative    Not on file      Medication Review  Current Outpatient Medications   Medication Instructions    aspirin 81 mg EC tablet 1 tablet, Daily    Calmoseptine 0.44-20.6 % ointment PLEASE SEE ATTACHED FOR DETAILED DIRECTIONS    cholecalciferol (VITAMIN D-3) 2,000 Units, Daily    lancets misc Test once daily as directed    levothyroxine (SYNTHROID, LEVOXYL) 100 mcg, oral, Daily    metFORMIN (GLUCOPHAGE) 1,000 mg, oral, 2 times daily (morning and late afternoon)    mometasone (Elocon) 0.1 % cream 1 Application, Daily    Mounjaro 7.5 mg, subcutaneous, Weekly    OneTouch Verio test strips strip 1 strip, transdermal, 3 times daily    simvastatin (Zocor) 20 mg tablet 1 tablet, Daily    valsartan (DIOVAN) 80 mg, oral, Daily      Vitals  BP Readings from Last 2 Encounters:   01/14/25  99/64   10/25/24 107/66     BMI Readings from Last 1 Encounters:   01/14/25 32.51 kg/m²      Labs  Lab Results   Component Value Date    HGBA1C 7.6 (H) 08/12/2024    HGBA1C 8.8 (H) 04/29/2024    HGBA1C 7.3 (H) 12/05/2023     Lab Results   Component Value Date    CALCIUM 9.6 04/27/2024     (L) 04/27/2024    K 4.0 04/27/2024    CO2 25 04/27/2024    CL 98 04/27/2024    BUN 13 04/27/2024    CREATININE 0.70 04/27/2024    EGFR 88 04/27/2024     Lab Results   Component Value Date    ALT 12 04/27/2024    AST 14 04/27/2024    ALKPHOS 35 04/27/2024    BILITOT 1.4 (H) 04/27/2024     Lab Results   Component Value Date    TRIG 187 (H) 12/05/2023    CHOL 156 12/05/2023    LDLF 35 06/16/2023    LDLCALC 57 12/05/2023    HDL 61.7 12/05/2023     Lab Results   Component Value Date    MICROALBCREA  08/12/2024      Comment:      One or more analytes used in this calculation is outside of the analytical measurement range. Calculation cannot be performed.     Wt Readings from Last 3 Encounters:   01/14/25 80.9 kg (178 lb 4.8 oz)   10/25/24 82.9 kg (182 lb 12.8 oz)   09/04/24 83.9 kg (185 lb)      There is no height or weight on file to calculate BMI.     Assessment/Plan   Problem List Items Addressed This Visit       Type 2 diabetes mellitus    Relevant Orders    Referral to Clinical Pharmacy     Patient's goal A1c is < 7%.  Is pt at goal? No, 7.6%  Patient's SMBGs are improved, AM -135.  Rationale for plan: No med changes today per patient preference until routine is re-established with providing significant care for  at home.      Medication Changes:  CONTINUE:  Metformin 1000 mg twice daily    Future consideration: Prefers to avoid GLP1a due to concern for potential adverse effects of fatigue and blurry vision (unclear if med vs life stressor related, will hold off for now)    Clinical Pharmacist follow-up: 6/25/25 10 AM Telehealth visit    Continue all meds under the continuation of care with the referring provider  and clinical pharmacy team.    Thank you,  Debra Mcfarlane, PharmD  Clinical Pharmacy Specialist  [phone]    Verbal consent to manage patient's drug therapy was obtained from the patient. They were informed they may decline to participate or withdraw from participation in pharmacy services at any time.

## 2025-06-10 ENCOUNTER — TELEPHONE (OUTPATIENT)
Dept: PRIMARY CARE | Facility: CLINIC | Age: 80
End: 2025-06-10
Payer: MEDICARE

## 2025-06-10 LAB
ALBUMIN SERPL-MCNC: 4.1 G/DL (ref 3.6–5.1)
ALBUMIN/CREAT UR: 9 MG/G CREAT
ALP SERPL-CCNC: 26 U/L (ref 37–153)
ALT SERPL-CCNC: 11 U/L (ref 6–29)
ANION GAP SERPL CALCULATED.4IONS-SCNC: 9 MMOL/L (CALC) (ref 7–17)
AST SERPL-CCNC: 13 U/L (ref 10–35)
BILIRUB SERPL-MCNC: 1.5 MG/DL (ref 0.2–1.2)
BUN SERPL-MCNC: 10 MG/DL (ref 7–25)
CALCIUM SERPL-MCNC: 9 MG/DL (ref 8.6–10.4)
CHLORIDE SERPL-SCNC: 104 MMOL/L (ref 98–110)
CHOLEST SERPL-MCNC: 138 MG/DL
CHOLEST/HDLC SERPL: 2.7 (CALC)
CO2 SERPL-SCNC: 27 MMOL/L (ref 20–32)
CREAT SERPL-MCNC: 0.55 MG/DL (ref 0.6–0.95)
CREAT UR-MCNC: 130 MG/DL (ref 20–275)
EGFRCR SERPLBLD CKD-EPI 2021: 92 ML/MIN/1.73M2
ERYTHROCYTE [DISTWIDTH] IN BLOOD BY AUTOMATED COUNT: 12.4 % (ref 11–15)
EST. AVERAGE GLUCOSE BLD GHB EST-MCNC: 154 MG/DL
EST. AVERAGE GLUCOSE BLD GHB EST-SCNC: 8.5 MMOL/L
GLUCOSE SERPL-MCNC: 188 MG/DL (ref 65–99)
HBA1C MFR BLD: 7 %
HCT VFR BLD AUTO: 40.1 % (ref 35–45)
HDLC SERPL-MCNC: 52 MG/DL
HGB BLD-MCNC: 13.1 G/DL (ref 11.7–15.5)
LDLC SERPL CALC-MCNC: 64 MG/DL (CALC)
MCH RBC QN AUTO: 31.8 PG (ref 27–33)
MCHC RBC AUTO-ENTMCNC: 32.7 G/DL (ref 32–36)
MCV RBC AUTO: 97.3 FL (ref 80–100)
MICROALBUMIN UR-MCNC: 1.2 MG/DL
NONHDLC SERPL-MCNC: 86 MG/DL (CALC)
PLATELET # BLD AUTO: 229 THOUSAND/UL (ref 140–400)
PMV BLD REES-ECKER: 12.1 FL (ref 7.5–12.5)
POTASSIUM SERPL-SCNC: 4.3 MMOL/L (ref 3.5–5.3)
PROT SERPL-MCNC: 5.8 G/DL (ref 6.1–8.1)
RBC # BLD AUTO: 4.12 MILLION/UL (ref 3.8–5.1)
SODIUM SERPL-SCNC: 140 MMOL/L (ref 135–146)
TRIGL SERPL-MCNC: 140 MG/DL
TSH SERPL-ACNC: 1.98 MIU/L (ref 0.4–4.5)
WBC # BLD AUTO: 4.1 THOUSAND/UL (ref 3.8–10.8)

## 2025-06-10 NOTE — TELEPHONE ENCOUNTER
Informed pt of results; pt does remark that her daily BG readings have been high but is hopeful about her A1c being better.

## 2025-06-12 ENCOUNTER — TELEMEDICINE (OUTPATIENT)
Dept: PHARMACY | Facility: HOSPITAL | Age: 80
End: 2025-06-12
Payer: MEDICARE

## 2025-06-12 DIAGNOSIS — E11.69 TYPE 2 DIABETES MELLITUS WITH OTHER SPECIFIED COMPLICATION, UNSPECIFIED WHETHER LONG TERM INSULIN USE (MULTI): ICD-10-CM

## 2025-06-12 DIAGNOSIS — E11.69 TYPE 2 DIABETES MELLITUS WITH OTHER SPECIFIED COMPLICATION, UNSPECIFIED WHETHER LONG TERM INSULIN USE (MULTI): Primary | ICD-10-CM

## 2025-06-12 PROCEDURE — RXMED WILLOW AMBULATORY MEDICATION CHARGE

## 2025-06-12 NOTE — PROGRESS NOTES
Clinical Pharmacy Appointment    Patient ID: Cherrie Garcia is a 80 y.o. female who presents for Diabetes.    Pt is here for Follow Up appointment.     Referring Provider/PCP: Praful Bernabe MD  Last visit with PCP: 1/14/25  Next visit with PCP: 6/17/25    Subjective     Interval history:  She stopped Mounjaro one month ago due to concerns for visual changes. Initially blood sugars were well controlled however now trending up AVG range 150-200. None above 250.  Significant stress with 's health who she is caring for at home.    DIABETES MELLITUS TYPE II:    Diagnosed with diabetes: a little over 10 years per patient  Known diabetic complications: None.  Does patient follow with Endocrinology: No  Last optometry exam: 2024   Most recent visit in Podiatry: September 2024     Current diabetic medications include:  Metformin 1000 mg twice daily    Adverse Effects: n/a    Past diabetic medications include:  Trulicity 3 mg once weekly on Mondays x 3- cloudy vision  Glipizide XL (replaced with trulicity)  Mounjaro 7.5mg weekly (blurry vision)    Glucose Readings:  Glucometer/CGM Type: glucometer  Patient tests BG 1 times per day (AM FBG)    Current home BG readings: 150-200    Previous home BG readings:  120-135    Any episodes of hypoglycemia? No Did patient treat episode of hypoglycemia appropriately? No (will treat with OJ)  Does the patient have a prescription for ready-to-use Glucagon? Not on insulin  Does pt have proteinuria? No    Secondary Prevention:  Statin? Yes  ACE-I/ARB? Yes  Aspirin? Yes    Pertinent PMH Review:  PMH of Pancreatitis: No  PMH of Retinopathy: No  PMH of Urinary Tract Infections: No  PMH of MTC: No    Medication Reconciliation:  Changed: None  Added: None  Discontinued: None    Drug Interactions  No relevant drug interactions were noted.    Medication System Management  [facility]  Adherence/Organization: No issues  Affordability/Accessibility: Enrolled in [facility]  through 9/17/25    Objective   Allergies   Allergen Reactions    Amoxicillin-Pot Clavulanate GI Upset     Can tolerate amoxicillin by itself (has gotten it for dental ppx).    Diclofenac Unknown    Mucinex [Guaifenesin] Unknown    Penicillins Unknown     Pt isn't sure what the allergy is or what the reaction was. Just knows she didn't tolerate augmentin.    Pseudoephedrine-Guaifenesin Unknown     Social History     Social History Narrative    Not on file      Medication Review  Current Outpatient Medications   Medication Instructions    aspirin 81 mg EC tablet 1 tablet, Daily    Calmoseptine 0.44-20.6 % ointment PLEASE SEE ATTACHED FOR DETAILED DIRECTIONS    cholecalciferol (VITAMIN D-3) 2,000 Units, Daily    empagliflozin (JARDIANCE) 10 mg, oral, Daily    lancets misc Test once daily as directed    levothyroxine (SYNTHROID, LEVOXYL) 100 mcg, oral, Daily    metFORMIN (GLUCOPHAGE) 1,000 mg, oral, 2 times daily (morning and late afternoon)    mometasone (Elocon) 0.1 % cream 1 Application, Daily    OneTouch Verio test strips strip 1 strip, transdermal, 3 times daily    simvastatin (Zocor) 20 mg tablet 1 tablet, Daily    valsartan (DIOVAN) 80 mg, oral, Daily      Vitals  BP Readings from Last 2 Encounters:   01/14/25 99/64   10/25/24 107/66     BMI Readings from Last 1 Encounters:   01/14/25 32.51 kg/m²      Labs  Lab Results   Component Value Date    HGBA1C 7.0 (H) 06/09/2025    HGBA1C 7.6 (H) 08/12/2024    HGBA1C 8.8 (H) 04/29/2024     Lab Results   Component Value Date    CALCIUM 9.0 06/09/2025     06/09/2025    K 4.3 06/09/2025    CO2 27 06/09/2025     06/09/2025    BUN 10 06/09/2025    CREATININE 0.55 (L) 06/09/2025    EGFR 92 06/09/2025     Lab Results   Component Value Date    ALT 11 06/09/2025    AST 13 06/09/2025    ALKPHOS 26 (L) 06/09/2025    BILITOT 1.5 (H) 06/09/2025     Lab Results   Component Value Date    TRIG 140 06/09/2025    CHOL 138 06/09/2025    LDLF 35 06/16/2023    LDLCALC 64  06/09/2025    HDL 52 06/09/2025     Lab Results   Component Value Date    MICROALBCREA 9 06/09/2025     Wt Readings from Last 3 Encounters:   01/14/25 80.9 kg (178 lb 4.8 oz)   10/25/24 82.9 kg (182 lb 12.8 oz)   09/04/24 83.9 kg (185 lb)      There is no height or weight on file to calculate BMI.     Assessment/Plan   Problem List Items Addressed This Visit       Type 2 diabetes mellitus    Relevant Medications    empagliflozin (Jardiance) 10 mg tablet     Patient's goal A1c is < 7%.  Is pt at goal? Yes 7%.  Patient's SMBGs are trending above goal AM -200.  Rationale for plan: BG trending higher since GLP1a was stopped. Prefers to avoid GLP1a due to concern for potential adverse effects of fatigue and blurry vision. Suboptimal response on glipizide XL in past. Discussed actos vs SGLT2i she prefers to trial Jardiance. Denies history of UTIs.     Medication Changes:  CONTINUE:  Metformin 1000 mg twice daily  START:  Jardiance 10mg once daily   Request addition to  PAP for cost assistance  Jardiance Education:   - Counseled patient on Jardiance MOA, expectations, side effects, duration of therapy, administration, and monitoring parameters.  - Reviewed the benefits of SGLT-2i therapy, such as glycemic control and kidney and CV protection.  - Advised patient to practice proper  hygiene to reduce risk of UTIs or yeast infections.  - Advised patient to maintain adequate fluid intake to remain hydrated while on SGLT2i therapy. Advised to aim for 64 ounces of water daily.   - Answered all patient questions and concerns.     Clinical Pharmacist follow-up: 6/25/25 10 AM Telehealth visit    Continue all meds under the continuation of care with the referring provider and clinical pharmacy team.    Thank you,  Debra Mcfarlane, PharmD  Clinical Pharmacy Specialist  [phone]    Verbal consent to manage patient's drug therapy was obtained from the patient. They were informed they may decline to participate or  withdraw from participation in pharmacy services at any time.

## 2025-06-17 ENCOUNTER — PHARMACY VISIT (OUTPATIENT)
Dept: PHARMACY | Facility: CLINIC | Age: 80
End: 2025-06-17
Payer: MEDICARE

## 2025-06-17 ENCOUNTER — APPOINTMENT (OUTPATIENT)
Dept: PRIMARY CARE | Facility: CLINIC | Age: 80
End: 2025-06-17
Payer: MEDICARE

## 2025-06-17 VITALS
OXYGEN SATURATION: 96 % | SYSTOLIC BLOOD PRESSURE: 97 MMHG | DIASTOLIC BLOOD PRESSURE: 60 MMHG | BODY MASS INDEX: 29.92 KG/M2 | WEIGHT: 164.1 LBS | HEART RATE: 76 BPM | RESPIRATION RATE: 14 BRPM

## 2025-06-17 DIAGNOSIS — E11.69 TYPE 2 DIABETES MELLITUS WITH OTHER SPECIFIED COMPLICATION, UNSPECIFIED WHETHER LONG TERM INSULIN USE (MULTI): ICD-10-CM

## 2025-06-17 PROCEDURE — 99213 OFFICE O/P EST LOW 20 MIN: CPT | Performed by: INTERNAL MEDICINE

## 2025-06-17 PROCEDURE — 3074F SYST BP LT 130 MM HG: CPT | Performed by: INTERNAL MEDICINE

## 2025-06-17 PROCEDURE — 3078F DIAST BP <80 MM HG: CPT | Performed by: INTERNAL MEDICINE

## 2025-06-17 PROCEDURE — 1159F MED LIST DOCD IN RCRD: CPT | Performed by: INTERNAL MEDICINE

## 2025-06-17 RX ORDER — METFORMIN HYDROCHLORIDE 500 MG/1
1000 TABLET ORAL
Qty: 360 TABLET | Refills: 3 | Status: SHIPPED | OUTPATIENT
Start: 2025-06-17 | End: 2026-06-17

## 2025-06-17 NOTE — PROGRESS NOTES
Subjective   Patient ID: Cherrie Garcia is a 80 y.o. female who presents for Follow-up.    Pt here for follow up.    Stopped mounjaro 5/7 (last injection). Started jardiance recently since. Last two weeks BS is up to the 150s, but is just taking the metformin.    Has had some blurry vision. Hasn't improved since stopping mounjaro over a month ago. Is seeing her ophthalmologist tomorrow.    After stopping heartburn medication pt hasn't had recurrence of symptoms for the most part. Did deal w/ some when her  was hospitalized.        Review of Systems   Eyes:  Positive for visual disturbance.       BP 97/60 (BP Location: Left arm, Patient Position: Sitting)   Pulse 76   Resp 14   Wt 74.4 kg (164 lb 1.6 oz)   SpO2 96%   BMI 29.92 kg/m²   Objective   Physical Exam  Constitutional:       General: She is not in acute distress.     Appearance: She is not ill-appearing, toxic-appearing or diaphoretic.   HENT:      Head: Normocephalic and atraumatic.   Neurological:      Mental Status: She is alert.         Assessment/Plan   Problem List Items Addressed This Visit           ICD-10-CM    Type 2 diabetes mellitus E11.9    -Pt working w/ pharmacist to get this under better control.  -Currently taking metformin 1g bid. About to start jardiance 10mg daily.  -Last A1c down to 7% from 7.6% previously. However, pt stopped mounjaro a month prior to that d/t feeling off. BS has been higher recently (is waiting for jardiance to get delivered).  -Will keep pt on this regimen for now and repeat A1c in at least 3 months.  -Discussed jardiance in detail. All questions answered.         Relevant Medications    metFORMIN (Glucophage) 500 mg tablet    Other Relevant Orders    Hemoglobin A1c            Praful Bernabe MD 06/17/25 3:20 PM

## 2025-06-17 NOTE — ASSESSMENT & PLAN NOTE
-Pt working w/ pharmacist to get this under better control.  -Currently taking metformin 1g bid. About to start jardiance 10mg daily.  -Last A1c down to 7% from 7.6% previously. However, pt stopped mounjaro a month prior to that d/t feeling off. BS has been higher recently (is waiting for jardiance to get delivered).  -Will keep pt on this regimen for now and repeat A1c in at least 3 months.  -Discussed jardiance in detail. All questions answered.

## 2025-06-25 ENCOUNTER — APPOINTMENT (OUTPATIENT)
Dept: PHARMACY | Facility: HOSPITAL | Age: 80
End: 2025-06-25
Payer: MEDICARE

## 2025-06-25 DIAGNOSIS — E11.69 TYPE 2 DIABETES MELLITUS WITH OTHER SPECIFIED COMPLICATION, UNSPECIFIED WHETHER LONG TERM INSULIN USE (MULTI): ICD-10-CM

## 2025-06-25 NOTE — PROGRESS NOTES
Clinical Pharmacy Appointment    Patient ID: Cherrie Garcia is a 80 y.o. female who presents for Diabetes.    Pt is here for Follow Up appointment.     Referring Provider/PCP: Praful Bernabe MD  Last visit with PCP: 1/14/25  Next visit with PCP: 6/17/25    Subjective     Interval history:  Since last visit, started Jardiance 10mg daily 1 week ago. No significant change in blood sugar yet.  Over the weekend developed vaginal irritation. Tried Vagisil which was not effective. Started using Calmoseptine ointment two days ago, feels slowly improving. No dysuria or polyuria.     Significant stress with 's health who she is caring for at home.    DIABETES MELLITUS TYPE II:    Diagnosed with diabetes: a little over 10 years per patient  Known diabetic complications: None.  Does patient follow with Endocrinology: No  Last optometry exam: 2024   Most recent visit in Podiatry: September 2024     Current diabetic medications include:  Metformin 1000 mg twice daily  Jardiance 10mg daily     Adverse Effects: None    Past diabetic medications include:  Trulicity 3 mg once weekly on Mondays x 3- cloudy vision  Glipizide XL (replaced with trulicity)  Mounjaro 7.5mg weekly (blurry vision)    Glucose Readings:  Glucometer/CGM Type: glucometer  Patient tests BG 1 times per day (AM [facility])    Current home BG readings: 150-200    Previous home BG readings:  150-200    Any episodes of hypoglycemia? No Did patient treat episode of hypoglycemia appropriately? No (will treat with OJ)  Does the patient have a prescription for ready-to-use Glucagon? Not on insulin  Does pt have proteinuria? No    Secondary Prevention:  Statin? Yes  ACE-I/ARB? Yes  Aspirin? Yes    Pertinent PMH Review:  PMH of Pancreatitis: No  PMH of Retinopathy: No  PMH of Urinary Tract Infections: No  PMH of MTC: No    Medication Reconciliation:  Changed: None  Added: None  Discontinued: None    Drug Interactions  No relevant drug interactions were noted.    Medication  System Management  Patients preferred [facility]: [facility]  Adherence/Organization: No issues  Affordability/Accessibility: Enrolled in  PAP through 9/17/25    Objective   Allergies   Allergen Reactions    Amoxicillin-Pot Clavulanate GI Upset     Can tolerate amoxicillin by itself (has gotten it for dental ppx).    Diclofenac Unknown    Mucinex [Guaifenesin] Unknown    Penicillins Unknown     Pt isn't sure what the allergy is or what the reaction was. Just knows she didn't tolerate augmentin.    Pseudoephedrine-Guaifenesin Unknown     Social History     Social History Narrative    Not on file      Medication Review  Current Outpatient Medications   Medication Instructions    aspirin 81 mg EC tablet 1 tablet, Daily    Calmoseptine 0.44-20.6 % ointment PLEASE SEE ATTACHED FOR DETAILED DIRECTIONS    cholecalciferol (VITAMIN D-3) 2,000 Units, Daily    Jardiance 10 mg, oral, Daily    lancets misc Test once daily as directed    levothyroxine (SYNTHROID, LEVOXYL) 100 mcg, oral, Daily    metFORMIN (GLUCOPHAGE) 1,000 mg, oral, 2 times daily (morning and late afternoon)    mometasone (Elocon) 0.1 % cream 1 Application, Daily    OneTouch Verio test strips strip 1 strip, transdermal, 3 times daily    simvastatin (Zocor) 20 mg tablet 1 tablet, Daily    valsartan (DIOVAN) 80 mg, oral, Daily      Vitals  BP Readings from Last 2 Encounters:   06/17/25 97/60   01/14/25 99/64     BMI Readings from Last 1 Encounters:   06/17/25 29.92 kg/m²      Labs  Lab Results   Component Value Date    HGBA1C 7.0 (H) 06/09/2025    HGBA1C 7.6 (H) 08/12/2024    HGBA1C 8.8 (H) 04/29/2024     Lab Results   Component Value Date    CALCIUM 9.0 06/09/2025     06/09/2025    K 4.3 06/09/2025    CO2 27 06/09/2025     06/09/2025    BUN 10 06/09/2025    CREATININE 0.55 (L) 06/09/2025    EGFR 92 06/09/2025     Lab Results   Component Value Date    ALT 11 06/09/2025    AST 13 06/09/2025    ALKPHOS 26 (L) 06/09/2025    BILITOT 1.5 (H) 06/09/2025     Lab  Results   Component Value Date    TRIG 140 06/09/2025    CHOL 138 06/09/2025    LDLF 35 06/16/2023    LDLCALC 64 06/09/2025    HDL 52 06/09/2025     Lab Results   Component Value Date    MICROALBCREA 9 06/09/2025     Wt Readings from Last 3 Encounters:   06/17/25 74.4 kg (164 lb 1.6 oz)   01/14/25 80.9 kg (178 lb 4.8 oz)   10/25/24 82.9 kg (182 lb 12.8 oz)      There is no height or weight on file to calculate BMI.     Assessment/Plan   Problem List Items Addressed This Visit       Type 2 diabetes mellitus    Relevant Orders    Referral to Clinical Pharmacy       Patient's goal A1c is < 7%.  Is pt at goal? Yes 7%  Patient's SMBGs are trending above goal AM -200.  Rationale for plan: BG trending higher since GLP1a was stopped. Prefers to avoid GLP1a due to concern for potential adverse effects of fatigue and blurry vision. Suboptimal response on glipizide XL in past. Discussed actos vs SGLT2i she prefers to trial Jardiance. Denies history of UTIs.     Medication Changes:  CONTINUE:  Metformin 1000 mg twice daily  Jardiance 10mg once daily   Monitor vaginal irritation (dryness vs developing yeast infection)  Advised may take a few weeks for improvement in blood sugars so will continue to monitor  If tolerates well without further  symptoms may consider dose escalation. If  symptoms persist will consider switch to actos.   Jardiance Education:   - Counseled patient on Jardiance MOA, expectations, side effects, duration of therapy, administration, and monitoring parameters.  - Reviewed the benefits of SGLT-2i therapy, such as glycemic control and kidney and CV protection.  - Advised patient to practice proper  hygiene to reduce risk of UTIs or yeast infections.  - Advised patient to maintain adequate fluid intake to remain hydrated while on SGLT2i therapy. Advised to aim for 64 ounces of water daily.   - Answered all patient questions and concerns.     Clinical Pharmacist follow-up: [facility]    Continue all meds under the continuation of care with the referring provider and clinical pharmacy team.    Thank you,  Debra Mcfarlane, PharmD  Clinical Pharmacy Specialist  [phone]    Verbal consent to manage patient's drug therapy was obtained from the patient. They were informed they may decline to participate or withdraw from participation in pharmacy services at any time.

## 2025-07-09 ENCOUNTER — APPOINTMENT (OUTPATIENT)
Dept: PHARMACY | Facility: HOSPITAL | Age: 80
End: 2025-07-09
Payer: MEDICARE

## 2025-07-09 DIAGNOSIS — E11.69 TYPE 2 DIABETES MELLITUS WITH OTHER SPECIFIED COMPLICATION, UNSPECIFIED WHETHER LONG TERM INSULIN USE (MULTI): ICD-10-CM

## 2025-07-09 PROCEDURE — RXMED WILLOW AMBULATORY MEDICATION CHARGE

## 2025-07-09 RX ORDER — TIRZEPATIDE 5 MG/.5ML
5 INJECTION, SOLUTION SUBCUTANEOUS WEEKLY
Qty: 2 ML | Refills: 0 | Status: SHIPPED | OUTPATIENT
Start: 2025-07-09

## 2025-07-09 NOTE — PROGRESS NOTES
Clinical Pharmacy Appointment    Patient ID: Cherrie Garcia is a 80 y.o. female who presents for Diabetes.    Pt is here for Follow Up appointment.     Referring Provider/PCP: Praful Bernabe MD  Last visit with PCP: 6/17/25  Next visit with PCP: 1/13/26    Subjective     Interval history:  Since last visit, started Jardiance 10mg 3 weeks ago. Persistent vaginal irritation improving slowly with calmoseptine ointment. No dysuria or polyuria. No significant change in blood sugar control.     Significant stress with 's health who she is caring for at home.    DIABETES MELLITUS TYPE II:    Diagnosed with diabetes: a little over 10 years per patient  Known diabetic complications: None.  Does patient follow with Endocrinology: No  Last optometry exam: 2025 macular degeneration   Most recent visit in Podiatry: September 2024     Current diabetic medications include:  Metformin 1000 mg twice daily  Jardiance 10mg daily     Adverse Effects: None    Past diabetic medications include:  Trulicity 3 mg once weekly on Mondays x 3- cloudy vision  Glipizide XL (replaced with trulicity)  Mounjaro 7.5mg weekly (blurry vision)    Glucose Readings:  Glucometer/CGM Type: glucometer  Patient tests BG 1 times per day (AM [facility])    Current home BG readings: 150-190    Previous home BG readings:  150-200    Any episodes of hypoglycemia? No Did patient treat episode of hypoglycemia appropriately? No (will treat with OJ)  Does the patient have a prescription for ready-to-use Glucagon? Not on insulin  Does pt have proteinuria? No    Secondary Prevention:  Statin? Yes  ACE-I/ARB? Yes  Aspirin? Yes    Pertinent PMH Review:  PMH of Pancreatitis: No  PMH of Retinopathy: No  PMH of Urinary Tract Infections: No  PMH of MTC: No    Medication Reconciliation:  Changed: None  Added: None  Discontinued: None    Drug Interactions  No relevant drug interactions were noted.    Medication System Management  [facility]  Adherence/Organization: No issues  Affordability/Accessibility: Enrolled in  PAP through 9/17/25    Objective   Allergies   Allergen Reactions    Amoxicillin-Pot Clavulanate GI Upset     Can tolerate amoxicillin by itself (has gotten it for dental ppx).    Diclofenac Unknown    Mucinex [Guaifenesin] Unknown    Penicillins Unknown     Pt isn't sure what the allergy is or what the reaction was. Just knows she didn't tolerate augmentin.    Pseudoephedrine-Guaifenesin Unknown     Social History     Social History Narrative    Not on file      Medication Review  Current Outpatient Medications   Medication Instructions    aspirin 81 mg EC tablet 1 tablet, Daily    Calmoseptine 0.44-20.6 % ointment PLEASE SEE ATTACHED FOR DETAILED DIRECTIONS    cholecalciferol (VITAMIN D-3) 2,000 Units, Daily    lancets misc Test once daily as directed    levothyroxine (SYNTHROID, LEVOXYL) 100 mcg, oral, Daily    metFORMIN (GLUCOPHAGE) 1,000 mg, oral, 2 times daily (morning and late afternoon)    mometasone (Elocon) 0.1 % cream 1 Application, Daily    Mounjaro 5 mg, subcutaneous, Weekly    OneTouch Verio test strips strip 1 strip, transdermal, 3 times daily    simvastatin (Zocor) 20 mg tablet 1 tablet, Daily    valsartan (DIOVAN) 80 mg, oral, Daily      Vitals  BP Readings from Last 2 Encounters:   06/17/25 97/60   01/14/25 99/64     BMI Readings from Last 1 Encounters:   06/17/25 29.92 kg/m²      Labs  Lab Results   Component Value Date    HGBA1C 7.0 (H) 06/09/2025    HGBA1C 7.6 (H) 08/12/2024    HGBA1C 8.8 (H) 04/29/2024     Lab Results   Component Value Date    CALCIUM 9.0 06/09/2025     06/09/2025    K 4.3 06/09/2025    CO2 27 06/09/2025     06/09/2025    BUN 10 06/09/2025    CREATININE 0.55 (L) 06/09/2025    EGFR 92 06/09/2025     Lab Results   Component Value Date    ALT 11 06/09/2025    AST 13 06/09/2025    ALKPHOS 26 (L) 06/09/2025    BILITOT 1.5 (H) 06/09/2025     Lab Results   Component Value Date    TRIG 140  06/09/2025    CHOL 138 06/09/2025    LDLF 35 06/16/2023    LDLCALC 64 06/09/2025    HDL 52 06/09/2025     Lab Results   Component Value Date    MICROALBCREA 9 06/09/2025     Wt Readings from Last 3 Encounters:   06/17/25 74.4 kg (164 lb 1.6 oz)   01/14/25 80.9 kg (178 lb 4.8 oz)   10/25/24 82.9 kg (182 lb 12.8 oz)      There is no height or weight on file to calculate BMI.     Assessment/Plan   Problem List Items Addressed This Visit       Type 2 diabetes mellitus    Relevant Medications    tirzepatide (Mounjaro) 5 mg/0.5 mL pen injector    Other Relevant Orders    Referral to Clinical Pharmacy     Patient's goal A1c is < 7%.  Is pt at goal? Yes 7%  Patient's SMBGs are trending above goal AM -190.  Rationale for plan: BG trending higher since GLP1a was stopped. Sub-optimal response on glipizide XL in past. Vaginal irritation and sub-optimal response on Jardiance.  Previously preferred to avoid GLP1a due to concern for potential adverse effects of fatigue and blurry vision. Since then determined blurry vision is related to eye infection and pressure changes which is being managed and she would like to re-try Mounjaro as this was most effective for glucose control.     Medication Changes:  CONTINUE:  Metformin 1000 mg twice daily  DISCONTINUE:  Jardiance due to persistent vaginal irritation and lack of benefit  INITIATE:  Mounjaro 5mg subcutaneous once weekly    Pending glycemic control at follow up will aim to stop metformin to consolidate therapy.    Counseled patient on Mounjaro MOA, expectations, side effects, duration of therapy, administration, and monitoring parameters.  Provided detailed dosing and administration counseling to ensure proper technique.   Reviewed Mounjaro titration schedule, starting with 2.5 mg once weekly to a goal of 15 mg once weekly if tolerated  Counseled patient on the benefits of GLP-1ra glycemic control and weight loss  Reviewed storage requirements of Mounjaro when not in use,  and when to administer the medication if a dose is missed.  Advised patient that they may experience improved satiety after meals and portion sizes of meals may be reduced as doses of Mounjaro increase.     Clinical Pharmacist follow-up: 4 weeks Telehealth visit    Continue all meds under the continuation of care with the referring provider and clinical pharmacy team.    Thank you,  Debra Mcfarlane, PharmD  Clinical Pharmacy Specialist  [phone]    Verbal consent to manage patient's drug therapy was obtained from the patient. They were informed they may decline to participate or withdraw from participation in pharmacy services at any time.

## 2025-07-15 ENCOUNTER — PHARMACY VISIT (OUTPATIENT)
Dept: PHARMACY | Facility: CLINIC | Age: 80
End: 2025-07-15
Payer: MEDICARE

## 2025-08-06 ENCOUNTER — APPOINTMENT (OUTPATIENT)
Dept: PHARMACY | Facility: HOSPITAL | Age: 80
End: 2025-08-06
Payer: MEDICARE

## 2025-08-06 DIAGNOSIS — E11.69 TYPE 2 DIABETES MELLITUS WITH OTHER SPECIFIED COMPLICATION, UNSPECIFIED WHETHER LONG TERM INSULIN USE (MULTI): Primary | ICD-10-CM

## 2025-08-06 PROCEDURE — RXMED WILLOW AMBULATORY MEDICATION CHARGE

## 2025-08-06 RX ORDER — TIRZEPATIDE 5 MG/.5ML
5 INJECTION, SOLUTION SUBCUTANEOUS WEEKLY
Qty: 2 ML | Refills: 2 | Status: SHIPPED | OUTPATIENT
Start: 2025-08-06

## 2025-08-06 NOTE — PROGRESS NOTES
Clinical Pharmacy Appointment    Patient ID: Cherrie Garcia is a 80 y.o. female who presents for Diabetes.    Pt is here for Follow Up appointment.     Referring Provider/PCP: Praful Bernabe MD  Last visit with PCP: 6/17/25  Next visit with PCP: 1/13/26    Subjective     Interval history:  Since last visit, Jardiance stopped due to persistent vaginal irritation and resumed Mounjaro (3 doses so far). First 2 weeks felt nauseous and tired, now resolved. No vomiting, diarrhea, abdominal pain or cramping. 2-3 lb weight loss in past month, attributes to busy schedule with caring for  after his surgery.   Annual eye exam in 2 weeks.    DIABETES MELLITUS TYPE II:    Diagnosed with diabetes: a little over 10 years per patient  Known diabetic complications: None.  Does patient follow with Endocrinology: No  Last optometry exam: 2025 macular degeneration   Most recent visit in Podiatry: September 2024     Current diabetic medications include:  Metformin 1000 mg twice daily  Mounjaro 5mg once weekly     Adverse Effects: None    Past diabetic medications include:  Trulicity 3 mg once weekly on Mondays x 3- cloudy vision  Glipizide XL (replaced with trulicity)  Mounjaro 7.5mg weekly (blurry vision)  Jardiance 10mg (vaginal irritation)    Glucose Readings:  Glucometer/CGM Type: glucometer  Patient tests BG 1 times per day (AM [facility])    Current home BG readings: 130-150    Previous home BG readings:  150-190    Any episodes of hypoglycemia? No Did patient treat episode of hypoglycemia appropriately? No (will treat with OJ)  Does the patient have a prescription for ready-to-use Glucagon? Not on insulin  Does pt have proteinuria? No    Secondary Prevention:  Statin? Yes  ACE-I/ARB? Yes  Aspirin? Yes    Pertinent PMH Review:  PMH of Pancreatitis: No  PMH of Retinopathy: No  PMH of Urinary Tract Infections: No  PMH of MTC: No    Medication Reconciliation:  Changed: None  Added: None  Discontinued: None    Drug Interactions  No  relevant drug interactions were noted.    Medication System Management  [facility]  Adherence/Organization: No issues  Affordability/Accessibility: Enrolled in  PAP through 9/17/25    Objective   Allergies   Allergen Reactions    Amoxicillin-Pot Clavulanate GI Upset     Can tolerate amoxicillin by itself (has gotten it for dental ppx).    Diclofenac Unknown    Mucinex [Guaifenesin] Unknown    Penicillins Unknown     Pt isn't sure what the allergy is or what the reaction was. Just knows she didn't tolerate augmentin.    Pseudoephedrine-Guaifenesin Unknown     Social History     Social History Narrative    Not on file      Medication Review  Current Outpatient Medications   Medication Instructions    aspirin 81 mg EC tablet 1 tablet, Daily    Calmoseptine 0.44-20.6 % ointment PLEASE SEE ATTACHED FOR DETAILED DIRECTIONS    cholecalciferol (VITAMIN D-3) 2,000 Units, Daily    lancets misc Test once daily as directed    levothyroxine (SYNTHROID, LEVOXYL) 100 mcg, oral, Daily    metFORMIN (GLUCOPHAGE) 1,000 mg, oral, 2 times daily (morning and late afternoon)    mometasone (Elocon) 0.1 % cream 1 Application, Daily    Mounjaro 5 mg, subcutaneous, Weekly    OneTouch Verio test strips strip 1 strip, transdermal, 3 times daily    simvastatin (Zocor) 20 mg tablet 1 tablet, Daily    valsartan (DIOVAN) 80 mg, oral, Daily      Vitals  BP Readings from Last 2 Encounters:   06/17/25 97/60   01/14/25 99/64     BMI Readings from Last 1 Encounters:   06/17/25 29.92 kg/m²      Labs  Lab Results   Component Value Date    HGBA1C 7.0 (H) 06/09/2025    HGBA1C 7.6 (H) 08/12/2024    HGBA1C 8.8 (H) 04/29/2024     Lab Results   Component Value Date    CALCIUM 9.0 06/09/2025     06/09/2025    K 4.3 06/09/2025    CO2 27 06/09/2025     06/09/2025    BUN 10 06/09/2025    CREATININE 0.55 (L) 06/09/2025    EGFR 92 06/09/2025     Lab Results   Component Value Date    ALT 11 06/09/2025    AST 13 06/09/2025    ALKPHOS  26 (L) 06/09/2025    BILITOT 1.5 (H) 06/09/2025     Lab Results   Component Value Date    TRIG 140 06/09/2025    CHOL 138 06/09/2025    LDLF 35 06/16/2023    LDLCALC 64 06/09/2025    HDL 52 06/09/2025     Lab Results   Component Value Date    MICROALBCREA 9 06/09/2025     Wt Readings from Last 3 Encounters:   06/17/25 74.4 kg (164 lb 1.6 oz)   01/14/25 80.9 kg (178 lb 4.8 oz)   10/25/24 82.9 kg (182 lb 12.8 oz)      There is no height or weight on file to calculate BMI.     Assessment/Plan   Problem List Items Addressed This Visit       Type 2 diabetes mellitus - Primary    Relevant Medications    tirzepatide (Mounjaro) 5 mg/0.5 mL pen injector    Other Relevant Orders    Referral to Clinical Pharmacy       Patient's goal A1c is < 7%.  Is pt at goal? Yes 7%  Patient's SMBGs are trending down AM -150.  Rationale for plan: Previously preferred to avoid GLP1a due to concern for potential adverse effects of fatigue and blurry vision. Since then determined blurry vision was related to eye infection and pressure changes which is being managed. Will maintain current dosing to further establish tolerance and gradual reduction of blood sugars to avoid any potential effect of rapid glucose lowering on eye pressure.     Medication Changes:  CONTINUE:  Metformin 1000 mg twice daily  Mounjaro 5mg subcutaneous once weekly    Counseled patient on Mounjaro MOA, expectations, side effects, duration of therapy, administration, and monitoring parameters.  Provided detailed dosing and administration counseling to ensure proper technique.   Reviewed Mounjaro titration schedule, starting with 2.5 mg once weekly to a goal of 15 mg once weekly if tolerated  Counseled patient on the benefits of GLP-1ra glycemic control and weight loss  Reviewed storage requirements of Mounjaro when not in use, and when to administer the medication if a dose is missed.  Advised patient that they may experience improved satiety after meals and portion  sizes of meals may be reduced as doses of Mounjaro increase.     Clinical Pharmacist follow-up: 9/3 10 AM for blood sugar review and  PAP renewal Telehealth visit    Continue all meds under the continuation of care with the referring provider and clinical pharmacy team.    Thank you,  Debra Mcfarlane, PharmD  Clinical Pharmacy Specialist  [phone]    Verbal consent to manage patient's drug therapy was obtained from the patient. They were informed they may decline to participate or withdraw from participation in pharmacy services at any time.

## 2025-08-07 ENCOUNTER — PHARMACY VISIT (OUTPATIENT)
Dept: PHARMACY | Facility: CLINIC | Age: 80
End: 2025-08-07
Payer: MEDICARE

## 2025-08-25 DIAGNOSIS — K21.9 GASTROESOPHAGEAL REFLUX DISEASE WITHOUT ESOPHAGITIS: Primary | ICD-10-CM

## 2025-08-25 RX ORDER — OMEPRAZOLE 20 MG/1
20 CAPSULE, DELAYED RELEASE ORAL DAILY
Qty: 30 CAPSULE | Refills: 0 | Status: SHIPPED | OUTPATIENT
Start: 2025-08-25

## 2025-09-02 PROCEDURE — RXMED WILLOW AMBULATORY MEDICATION CHARGE

## 2025-09-03 ENCOUNTER — APPOINTMENT (OUTPATIENT)
Dept: PHARMACY | Facility: HOSPITAL | Age: 80
End: 2025-09-03
Payer: MEDICARE

## 2025-09-04 ENCOUNTER — PHARMACY VISIT (OUTPATIENT)
Dept: PHARMACY | Facility: CLINIC | Age: 80
End: 2025-09-04
Payer: MEDICARE

## 2025-11-05 ENCOUNTER — APPOINTMENT (OUTPATIENT)
Dept: PHARMACY | Facility: HOSPITAL | Age: 80
End: 2025-11-05
Payer: MEDICARE

## 2026-01-13 ENCOUNTER — APPOINTMENT (OUTPATIENT)
Dept: PRIMARY CARE | Facility: CLINIC | Age: 81
End: 2026-01-13
Payer: MEDICARE